# Patient Record
Sex: FEMALE | Race: WHITE | NOT HISPANIC OR LATINO | Employment: UNEMPLOYED | URBAN - METROPOLITAN AREA
[De-identification: names, ages, dates, MRNs, and addresses within clinical notes are randomized per-mention and may not be internally consistent; named-entity substitution may affect disease eponyms.]

---

## 2017-07-02 PROBLEM — F17.210 DEPENDENCE ON NICOTINE FROM CIGARETTES: Status: ACTIVE | Noted: 2017-07-02

## 2017-07-02 PROBLEM — I70.209 ARTERIAL OCCLUSION, LOWER EXTREMITY: Status: ACTIVE | Noted: 2017-07-02

## 2017-07-03 PROBLEM — E66.01 MORBID OBESITY: Status: ACTIVE | Noted: 2017-07-03

## 2017-07-03 PROBLEM — E63.9 INADEQUATE DIETARY ENERGY INTAKE: Status: ACTIVE | Noted: 2017-07-03

## 2017-10-02 ENCOUNTER — HISTORICAL (OUTPATIENT)
Dept: SURGERY | Facility: HOSPITAL | Age: 42
End: 2017-10-02

## 2020-06-29 ENCOUNTER — HISTORICAL (OUTPATIENT)
Dept: ADMINISTRATIVE | Facility: HOSPITAL | Age: 45
End: 2020-06-29

## 2020-06-29 LAB
ALBUMIN SERPL BCP-MCNC: 3.4 G/DL (ref 3.5–5)
ALBUMIN/GLOB SERPL ELPH: 1 {RATIO} (ref 1.5–2.2)
ALP SERPL-CCNC: 99 U/L (ref 45–117)
ALT SERPL W P-5'-P-CCNC: 64 U/L (ref 13–56)
ANION GAP SERPL CALC-SCNC: 10.4 MEQ/L (ref 10–20)
APTT PPP: 23.8 SEC (ref 23–30.4)
AST SERPL-CCNC: 37 U/L (ref 15–37)
BASOPHILS NFR BLD: 0 10 (ref 0–0.1)
BASOPHILS NFR BLD: 0.4 % (ref 0–1.5)
BILIRUB SERPL-MCNC: 0.38 MG/DL (ref 0.2–1)
BUN SERPL-MCNC: 15 MG/DL (ref 7–18)
CALCIUM SERPL-MCNC: 8.7 MG/DL (ref 8.5–10.1)
CHLORIDE SERPL-SCNC: 106 MMOL/L (ref 98–107)
CO2 SERPL-SCNC: 28 MMOL/L (ref 22–32)
CREAT SERPL-MCNC: 1.14 MG/DL (ref 0.55–1.02)
EGFR: 54 ML/MIN/1.73M
EOSINOPHIL NFR BLD: 0.1 10 (ref 0–0.7)
EOSINOPHIL NFR BLD: 0.7 % (ref 0–7)
ERYTHROCYTE [DISTWIDTH] IN BLOOD BY AUTOMATED COUNT: 11.8 % (ref 11.5–14.5)
GLOBULIN: 3.3 G/DL (ref 2.3–3.5)
GLUCOSE SERPL-MCNC: 113 MG/DL (ref 70–99)
GRAN #: 7.42 10 (ref 2–7.5)
GRAN%: 0.4 %
GRAN%: 75.7 % (ref 50–80)
HCT VFR BLD AUTO: 40 % (ref 37.7–47.9)
HGB BLD-MCNC: 13.6 G/DL (ref 12.2–16.2)
IMMATURE GRANULOCYTES #: 0.04 10
INR PPP: 1 (ref 0.9–1.2)
LYMPH #: 1.8 10 (ref 1–3.5)
LYMPH%: 17.8 % (ref 12–50)
MCH RBC QN AUTO: 33.5 PG (ref 27–31)
MCHC RBC AUTO-ENTMCNC: 34 G% (ref 32–35)
MCV RBC AUTO: 98.5 FL (ref 80–97)
MONO #: 0.5 10 (ref 0–0.8)
MONO%: 5 % (ref 0–12)
OSMOC: 283 MOSM/KG (ref 275–295)
PMV BLD AUTO: 10.9 FL (ref 7.4–10.4)
PMV BLD AUTO: 201 10 (ref 142–424)
POTASSIUM SERPL-SCNC: 3.4 MMOL/L (ref 3.5–5.1)
PROT SERPL-MCNC: 6.7 G/DL (ref 6.4–8.2)
PROTHROMBIN TIME: 10.1 SEC (ref 9.8–12.4)
RBC # BLD AUTO: 4.06 M/UL (ref 4.04–5.48)
SODIUM BLD-SCNC: 141 MMOL/L (ref 136–145)
TROPONIN I SERPL DL<=0.01 NG/ML-MCNC: 0.1 NG/ML (ref 0–0.05)
TROPONIN I SERPL DL<=0.01 NG/ML-MCNC: 0.16 NG/ML (ref 0–0.05)
WBC # BLD AUTO: 9.8 10 (ref 4–10.2)

## 2020-11-03 ENCOUNTER — HOSPITAL ENCOUNTER (EMERGENCY)
Facility: HOSPITAL | Age: 45
Discharge: HOME | End: 2020-11-04
Attending: EMERGENCY MEDICINE
Payer: MEDICAID

## 2020-11-03 DIAGNOSIS — S60.419A: ICD-10-CM

## 2020-11-03 DIAGNOSIS — S60.00XA CONTUSION OF FINGER OF RIGHT HAND, UNSPECIFIED FINGER, INITIAL ENCOUNTER: Primary | ICD-10-CM

## 2020-11-03 PROCEDURE — 99283 EMERGENCY DEPT VISIT LOW MDM: CPT | Mod: 25

## 2020-11-03 RX ORDER — IBUPROFEN 600 MG/1
600 TABLET ORAL
Status: COMPLETED | OUTPATIENT
Start: 2020-11-04 | End: 2020-11-04

## 2020-11-04 VITALS
DIASTOLIC BLOOD PRESSURE: 56 MMHG | SYSTOLIC BLOOD PRESSURE: 168 MMHG | HEIGHT: 64 IN | BODY MASS INDEX: 40.97 KG/M2 | OXYGEN SATURATION: 99 % | WEIGHT: 240 LBS | RESPIRATION RATE: 18 BRPM | HEART RATE: 85 BPM | TEMPERATURE: 98 F

## 2020-11-04 PROCEDURE — 25000003 PHARM REV CODE 250: Performed by: EMERGENCY MEDICINE

## 2020-11-04 RX ADMIN — IBUPROFEN 600 MG: 600 TABLET, FILM COATED ORAL at 12:11

## 2020-11-04 NOTE — ED PROVIDER NOTES
Encounter Date: 11/3/2020       History     Chief Complaint   Patient presents with    Finger Injury     Holding window open for window unit. Window fell on right hand. Pt c/o pain to righ 3rd,4th, and 5th digits. Small lac noted to middle finger      45-year-old female presents with right hand pain.  Patient states that she is holding a window open and closed on her hand.  This occurred just prior to arrival.  She complains of pain around the PIP joints of the 2nd 3rd and 4th digits.  Patient complains of pain and a small abrasion to the dorsal aspect of the middle finger        Review of patient's allergies indicates:   Allergen Reactions    Tegretol [carbamazepine] Swelling    Lamictal [lamotrigine] Rash     Past Medical History:   Diagnosis Date    Anxiety     Depression     DVT (deep venous thrombosis)     Headache(784.0)     Hepatitis     Hep C ab    History of blood clots     Hypertension     Insomnia     Pulmonary embolism     Seizures      Past Surgical History:   Procedure Laterality Date    BREAST MASS EXCISION       SECTION      CHOLECYSTECTOMY      FEMORAL ARTERY STENT      HYSTERECTOMY      LYMPHADENECTOMY      UPPER GASTROINTESTINAL ENDOSCOPY       Family History   Problem Relation Age of Onset    Stroke Mother     Heart disease Mother     Heart attack Mother     Cancer Father     Lung cancer Father     Brain cancer Father      Social History     Tobacco Use    Smoking status: Current Every Day Smoker     Packs/day: 0.50     Types: Cigarettes   Substance Use Topics    Alcohol use: No     Alcohol/week: 0.0 standard drinks    Drug use: No     Review of Systems   Musculoskeletal:        Pain to right hand and small abrasion   All other systems reviewed and are negative.      Physical Exam     Initial Vitals [20 2348]   BP Pulse Resp Temp SpO2   (!) 191/88 91 18 97.6 °F (36.4 °C) 99 %      MAP       --         Physical Exam    Nursing note and vitals  reviewed.  Constitutional: She appears well-developed and well-nourished.   HENT:   Head: Atraumatic.   Cardiovascular: Normal rate and regular rhythm.   Pulmonary/Chest: Breath sounds normal. No respiratory distress.   Musculoskeletal:      Comments: Right hand: No deformity mild tenderness to palpation diffusely of the proximal and middle phalanx of the 2nd 3rd 4th digits.  There is full range of motion of all digits.  Cap refill is brisk and sensation is intact there is a superficial abrasion to the dorsal aspect of the 3rd digit middle  phalanx         ED Course   Procedures  Labs Reviewed - No data to display       Imaging Results          X-Ray Hand 3 view Right (In process)  Result time 11/04/20 00:03:47                 Medical Decision Making:   ED Management:   X-rays negative                             Clinical Impression:     ICD-10-CM ICD-9-CM   1. Contusion of finger of right hand, unspecified finger, initial encounter  S60.00XA 923.3   2. Abrasion, finger without infection  S60.419A 915.0                          ED Disposition Condition    Discharge Stable        ED Prescriptions     None        Follow-up Information     Follow up With Specialties Details Why Contact Info    primary care physician   As needed, If symptoms worsen                                        Stephan Elizabeth MD  11/04/20 0039

## 2021-02-10 ENCOUNTER — HOSPITAL ENCOUNTER (EMERGENCY)
Facility: HOSPITAL | Age: 46
Discharge: HOME | End: 2021-02-10
Attending: EMERGENCY MEDICINE
Payer: MEDICAID

## 2021-02-10 VITALS
HEART RATE: 82 BPM | RESPIRATION RATE: 18 BRPM | BODY MASS INDEX: 39.49 KG/M2 | OXYGEN SATURATION: 99 % | DIASTOLIC BLOOD PRESSURE: 99 MMHG | WEIGHT: 237 LBS | HEIGHT: 65 IN | TEMPERATURE: 98 F | SYSTOLIC BLOOD PRESSURE: 188 MMHG

## 2021-02-10 DIAGNOSIS — S80.811A ABRASION OF RIGHT LEG, INITIAL ENCOUNTER: ICD-10-CM

## 2021-02-10 DIAGNOSIS — T14.90XA INJURY: ICD-10-CM

## 2021-02-10 DIAGNOSIS — I10 HYPERTENSION, UNSPECIFIED TYPE: ICD-10-CM

## 2021-02-10 DIAGNOSIS — M79.604 RIGHT LEG PAIN: Primary | ICD-10-CM

## 2021-02-10 PROCEDURE — 25000003 PHARM REV CODE 250: Performed by: NURSE PRACTITIONER

## 2021-02-10 PROCEDURE — 99283 EMERGENCY DEPT VISIT LOW MDM: CPT | Mod: 25

## 2021-02-10 RX ORDER — MUPIROCIN 20 MG/G
OINTMENT TOPICAL 3 TIMES DAILY
Qty: 22 G | Refills: 0 | Status: SHIPPED | OUTPATIENT
Start: 2021-02-10 | End: 2021-02-20

## 2021-02-10 RX ORDER — METOPROLOL TARTRATE 25 MG/1
10 TABLET, FILM COATED ORAL 2 TIMES DAILY
Status: ON HOLD | COMMUNITY
End: 2021-05-03 | Stop reason: HOSPADM

## 2021-02-10 RX ORDER — NIFEDIPINE 20 MG/1
20 CAPSULE ORAL EVERY 8 HOURS
Status: ON HOLD | COMMUNITY
End: 2021-05-03 | Stop reason: HOSPADM

## 2021-02-10 RX ORDER — HYDRALAZINE HYDROCHLORIDE 25 MG/1
25 TABLET, FILM COATED ORAL
Status: COMPLETED | OUTPATIENT
Start: 2021-02-10 | End: 2021-02-10

## 2021-02-10 RX ADMIN — HYDRALAZINE HYDROCHLORIDE 25 MG: 25 TABLET, FILM COATED ORAL at 02:02

## 2021-04-27 ENCOUNTER — HOSPITAL ENCOUNTER (EMERGENCY)
Facility: HOSPITAL | Age: 46
Discharge: HOME | End: 2021-04-28
Attending: EMERGENCY MEDICINE
Payer: MEDICAID

## 2021-04-27 DIAGNOSIS — R07.9 CHEST PAIN: ICD-10-CM

## 2021-04-27 DIAGNOSIS — R79.89 ELEVATED TROPONIN: ICD-10-CM

## 2021-04-27 DIAGNOSIS — I10 MALIGNANT HYPERTENSION: Primary | ICD-10-CM

## 2021-04-27 LAB
BASOPHILS # BLD AUTO: 0.03 K/UL (ref 0–0.2)
BASOPHILS NFR BLD: 0.3 % (ref 0–1.9)
DIFFERENTIAL METHOD: ABNORMAL
EOSINOPHIL # BLD AUTO: 0.2 K/UL (ref 0–0.5)
EOSINOPHIL NFR BLD: 2.1 % (ref 0–8)
ERYTHROCYTE [DISTWIDTH] IN BLOOD BY AUTOMATED COUNT: 12.3 % (ref 11.5–14.5)
HCT VFR BLD AUTO: 44.7 % (ref 37–48.5)
HGB BLD-MCNC: 14.6 G/DL (ref 12–16)
IMM GRANULOCYTES # BLD AUTO: 0.04 K/UL (ref 0–0.04)
IMM GRANULOCYTES NFR BLD AUTO: 0.4 % (ref 0–0.5)
LYMPHOCYTES # BLD AUTO: 3 K/UL (ref 1–4.8)
LYMPHOCYTES NFR BLD: 29.1 % (ref 18–48)
MCH RBC QN AUTO: 31.9 PG (ref 27–31)
MCHC RBC AUTO-ENTMCNC: 32.7 G/DL (ref 32–36)
MCV RBC AUTO: 98 FL (ref 82–98)
MONOCYTES # BLD AUTO: 0.8 K/UL (ref 0.3–1)
MONOCYTES NFR BLD: 7.8 % (ref 4–15)
NEUTROPHILS # BLD AUTO: 6.3 K/UL (ref 1.8–7.7)
NEUTROPHILS NFR BLD: 60.3 % (ref 38–73)
NRBC BLD-RTO: 0 /100 WBC
PLATELET # BLD AUTO: 183 K/UL (ref 150–450)
PMV BLD AUTO: 11.6 FL (ref 9.2–12.9)
RBC # BLD AUTO: 4.58 M/UL (ref 4–5.4)
WBC # BLD AUTO: 10.41 K/UL (ref 3.9–12.7)

## 2021-04-27 PROCEDURE — 93005 ELECTROCARDIOGRAM TRACING: CPT

## 2021-04-27 PROCEDURE — 84484 ASSAY OF TROPONIN QUANT: CPT | Performed by: EMERGENCY MEDICINE

## 2021-04-27 PROCEDURE — 83880 ASSAY OF NATRIURETIC PEPTIDE: CPT | Performed by: EMERGENCY MEDICINE

## 2021-04-27 PROCEDURE — 83690 ASSAY OF LIPASE: CPT | Performed by: EMERGENCY MEDICINE

## 2021-04-27 PROCEDURE — 96375 TX/PRO/DX INJ NEW DRUG ADDON: CPT

## 2021-04-27 PROCEDURE — 85025 COMPLETE CBC W/AUTO DIFF WBC: CPT | Performed by: EMERGENCY MEDICINE

## 2021-04-27 PROCEDURE — 93010 ELECTROCARDIOGRAM REPORT: CPT | Mod: ,,, | Performed by: INTERNAL MEDICINE

## 2021-04-27 PROCEDURE — 96365 THER/PROPH/DIAG IV INF INIT: CPT

## 2021-04-27 PROCEDURE — 36415 COLL VENOUS BLD VENIPUNCTURE: CPT | Performed by: EMERGENCY MEDICINE

## 2021-04-27 PROCEDURE — 25000003 PHARM REV CODE 250: Performed by: EMERGENCY MEDICINE

## 2021-04-27 PROCEDURE — 63600175 PHARM REV CODE 636 W HCPCS: Performed by: EMERGENCY MEDICINE

## 2021-04-27 PROCEDURE — 99285 EMERGENCY DEPT VISIT HI MDM: CPT | Mod: 25

## 2021-04-27 PROCEDURE — 80053 COMPREHEN METABOLIC PANEL: CPT | Performed by: EMERGENCY MEDICINE

## 2021-04-27 PROCEDURE — 93010 EKG 12-LEAD: ICD-10-PCS | Mod: ,,, | Performed by: INTERNAL MEDICINE

## 2021-04-27 RX ORDER — DIAZEPAM 10 MG/2ML
5 INJECTION INTRAMUSCULAR
Status: COMPLETED | OUTPATIENT
Start: 2021-04-27 | End: 2021-04-27

## 2021-04-27 RX ORDER — CLONIDINE HYDROCHLORIDE 0.2 MG/1
0.2 TABLET ORAL
Status: COMPLETED | OUTPATIENT
Start: 2021-04-27 | End: 2021-04-27

## 2021-04-27 RX ADMIN — PROMETHAZINE HYDROCHLORIDE 12.5 MG: 25 INJECTION INTRAMUSCULAR; INTRAVENOUS at 11:04

## 2021-04-27 RX ADMIN — DIAZEPAM 5 MG: 10 INJECTION, SOLUTION INTRAMUSCULAR; INTRAVENOUS at 11:04

## 2021-04-27 RX ADMIN — CLONIDINE HYDROCHLORIDE 0.2 MG: 0.2 TABLET ORAL at 11:04

## 2021-04-27 RX ADMIN — SODIUM CHLORIDE 1000 ML: 0.9 INJECTION, SOLUTION INTRAVENOUS at 11:04

## 2021-04-28 VITALS
HEIGHT: 65 IN | WEIGHT: 249 LBS | DIASTOLIC BLOOD PRESSURE: 79 MMHG | TEMPERATURE: 98 F | HEART RATE: 68 BPM | RESPIRATION RATE: 16 BRPM | BODY MASS INDEX: 41.48 KG/M2 | OXYGEN SATURATION: 98 % | SYSTOLIC BLOOD PRESSURE: 164 MMHG

## 2021-04-28 LAB
ALBUMIN SERPL BCP-MCNC: 3.2 G/DL (ref 3.5–5.2)
ALP SERPL-CCNC: 141 U/L (ref 55–135)
ALT SERPL W/O P-5'-P-CCNC: 144 U/L (ref 10–44)
ANION GAP SERPL CALC-SCNC: 4 MMOL/L (ref 8–16)
AST SERPL-CCNC: 58 U/L (ref 10–40)
BILIRUB SERPL-MCNC: 0.2 MG/DL (ref 0.1–1)
BILIRUB UR QL STRIP: NEGATIVE
BUN SERPL-MCNC: 12 MG/DL (ref 6–20)
CALCIUM SERPL-MCNC: 8.8 MG/DL (ref 8.7–10.5)
CHLORIDE SERPL-SCNC: 108 MMOL/L (ref 95–110)
CLARITY UR: CLEAR
CO2 SERPL-SCNC: 30 MMOL/L (ref 23–29)
COLOR UR: YELLOW
CREAT SERPL-MCNC: 1 MG/DL (ref 0.5–1.4)
EST. GFR  (AFRICAN AMERICAN): >60 ML/MIN/1.73 M^2
EST. GFR  (NON AFRICAN AMERICAN): >60 ML/MIN/1.73 M^2
GLUCOSE SERPL-MCNC: 125 MG/DL (ref 70–110)
GLUCOSE UR QL STRIP: NEGATIVE
HGB UR QL STRIP: NEGATIVE
KETONES UR QL STRIP: NEGATIVE
LEUKOCYTE ESTERASE UR QL STRIP: NEGATIVE
LIPASE SERPL-CCNC: 165 U/L (ref 23–300)
NITRITE UR QL STRIP: NEGATIVE
NT-PROBNP SERPL-MCNC: 1046 PG/ML (ref 5–450)
PH UR STRIP: 7 [PH] (ref 5–8)
POTASSIUM SERPL-SCNC: 3.8 MMOL/L (ref 3.5–5.1)
PROT SERPL-MCNC: 6.4 G/DL (ref 6–8.4)
PROT UR QL STRIP: NEGATIVE
SODIUM SERPL-SCNC: 142 MMOL/L (ref 136–145)
SP GR UR STRIP: 1.01 (ref 1–1.03)
TROPONIN I SERPL DL<=0.01 NG/ML-MCNC: 0.04 NG/ML (ref 0–0.03)
TROPONIN I SERPL DL<=0.01 NG/ML-MCNC: 0.05 NG/ML (ref 0–0.03)
URN SPEC COLLECT METH UR: NORMAL
UROBILINOGEN UR STRIP-ACNC: 1 EU/DL

## 2021-04-28 PROCEDURE — 84484 ASSAY OF TROPONIN QUANT: CPT | Performed by: EMERGENCY MEDICINE

## 2021-04-28 PROCEDURE — 25000003 PHARM REV CODE 250: Performed by: EMERGENCY MEDICINE

## 2021-04-28 PROCEDURE — 36415 COLL VENOUS BLD VENIPUNCTURE: CPT | Performed by: EMERGENCY MEDICINE

## 2021-04-28 PROCEDURE — 81003 URINALYSIS AUTO W/O SCOPE: CPT | Performed by: EMERGENCY MEDICINE

## 2021-04-28 RX ORDER — CLONIDINE HYDROCHLORIDE 0.2 MG/1
0.2 TABLET ORAL 2 TIMES DAILY PRN
Qty: 30 TABLET | Refills: 11 | Status: ON HOLD | OUTPATIENT
Start: 2021-04-28 | End: 2021-05-03 | Stop reason: HOSPADM

## 2021-04-28 RX ORDER — DIAZEPAM 5 MG/1
10 TABLET ORAL EVERY 8 HOURS PRN
Qty: 6 TABLET | Refills: 0 | Status: SHIPPED | OUTPATIENT
Start: 2021-04-28 | End: 2021-05-28

## 2021-04-28 RX ORDER — NAPROXEN SODIUM 220 MG/1
324 TABLET, FILM COATED ORAL
Status: COMPLETED | OUTPATIENT
Start: 2021-04-28 | End: 2021-04-28

## 2021-04-28 RX ADMIN — ASPIRIN 324 MG: 81 TABLET, CHEWABLE ORAL at 12:04

## 2021-05-02 ENCOUNTER — HOSPITAL ENCOUNTER (OUTPATIENT)
Facility: HOSPITAL | Age: 46
Discharge: HOME | End: 2021-05-03
Attending: FAMILY MEDICINE | Admitting: INTERNAL MEDICINE
Payer: MEDICAID

## 2021-05-02 DIAGNOSIS — I50.9 HEART FAILURE, UNSPECIFIED HF CHRONICITY, UNSPECIFIED HEART FAILURE TYPE: ICD-10-CM

## 2021-05-02 DIAGNOSIS — I50.9 HEART FAILURE: ICD-10-CM

## 2021-05-02 DIAGNOSIS — R79.89 ELEVATED TROPONIN: ICD-10-CM

## 2021-05-02 DIAGNOSIS — I20.89 ANGINA AT REST: ICD-10-CM

## 2021-05-02 DIAGNOSIS — R07.9 CHEST PAIN: ICD-10-CM

## 2021-05-02 DIAGNOSIS — I10 UNCONTROLLED HYPERTENSION: Primary | ICD-10-CM

## 2021-05-02 LAB
ALBUMIN SERPL BCP-MCNC: 3.3 G/DL (ref 3.5–5.2)
ALP SERPL-CCNC: 128 U/L (ref 55–135)
ALT SERPL W/O P-5'-P-CCNC: 82 U/L (ref 10–44)
ANION GAP SERPL CALC-SCNC: 7 MMOL/L (ref 8–16)
AST SERPL-CCNC: 29 U/L (ref 10–40)
BASOPHILS # BLD AUTO: 0.06 K/UL (ref 0–0.2)
BASOPHILS NFR BLD: 0.5 % (ref 0–1.9)
BILIRUB SERPL-MCNC: 0.4 MG/DL (ref 0.1–1)
BUN SERPL-MCNC: 13 MG/DL (ref 6–20)
CALCIUM SERPL-MCNC: 9.1 MG/DL (ref 8.7–10.5)
CHLORIDE SERPL-SCNC: 106 MMOL/L (ref 95–110)
CO2 SERPL-SCNC: 30 MMOL/L (ref 23–29)
CREAT SERPL-MCNC: 0.9 MG/DL (ref 0.5–1.4)
CTP QC/QA: YES
DIFFERENTIAL METHOD: ABNORMAL
EOSINOPHIL # BLD AUTO: 0.2 K/UL (ref 0–0.5)
EOSINOPHIL NFR BLD: 1.6 % (ref 0–8)
ERYTHROCYTE [DISTWIDTH] IN BLOOD BY AUTOMATED COUNT: 12.5 % (ref 11.5–14.5)
EST. GFR  (AFRICAN AMERICAN): >60 ML/MIN/1.73 M^2
EST. GFR  (NON AFRICAN AMERICAN): >60 ML/MIN/1.73 M^2
GLUCOSE SERPL-MCNC: 99 MG/DL (ref 70–110)
HCT VFR BLD AUTO: 45.3 % (ref 37–48.5)
HGB BLD-MCNC: 14.6 G/DL (ref 12–16)
IMM GRANULOCYTES # BLD AUTO: 0.06 K/UL (ref 0–0.04)
IMM GRANULOCYTES NFR BLD AUTO: 0.5 % (ref 0–0.5)
LYMPHOCYTES # BLD AUTO: 2.8 K/UL (ref 1–4.8)
LYMPHOCYTES NFR BLD: 24.4 % (ref 18–48)
MCH RBC QN AUTO: 31.3 PG (ref 27–31)
MCHC RBC AUTO-ENTMCNC: 32.2 G/DL (ref 32–36)
MCV RBC AUTO: 97 FL (ref 82–98)
MONOCYTES # BLD AUTO: 0.7 K/UL (ref 0.3–1)
MONOCYTES NFR BLD: 6.1 % (ref 4–15)
NEUTROPHILS # BLD AUTO: 7.7 K/UL (ref 1.8–7.7)
NEUTROPHILS NFR BLD: 66.9 % (ref 38–73)
NRBC BLD-RTO: 0 /100 WBC
NT-PROBNP SERPL-MCNC: 931 PG/ML (ref 5–450)
PLATELET # BLD AUTO: 206 K/UL (ref 150–450)
PMV BLD AUTO: 11.2 FL (ref 9.2–12.9)
POTASSIUM SERPL-SCNC: 3.5 MMOL/L (ref 3.5–5.1)
PROT SERPL-MCNC: 6.6 G/DL (ref 6–8.4)
RBC # BLD AUTO: 4.66 M/UL (ref 4–5.4)
SARS-COV-2 RDRP RESP QL NAA+PROBE: NEGATIVE
SODIUM SERPL-SCNC: 143 MMOL/L (ref 136–145)
TROPONIN I SERPL DL<=0.01 NG/ML-MCNC: 0.08 NG/ML (ref 0–0.03)
TROPONIN I SERPL DL<=0.01 NG/ML-MCNC: 0.08 NG/ML (ref 0–0.03)
WBC # BLD AUTO: 11.56 K/UL (ref 3.9–12.7)

## 2021-05-02 PROCEDURE — 36415 COLL VENOUS BLD VENIPUNCTURE: CPT | Performed by: NURSE PRACTITIONER

## 2021-05-02 PROCEDURE — 25000242 PHARM REV CODE 250 ALT 637 W/ HCPCS: Performed by: FAMILY MEDICINE

## 2021-05-02 PROCEDURE — 96374 THER/PROPH/DIAG INJ IV PUSH: CPT

## 2021-05-02 PROCEDURE — 84484 ASSAY OF TROPONIN QUANT: CPT | Performed by: NURSE PRACTITIONER

## 2021-05-02 PROCEDURE — 93005 ELECTROCARDIOGRAM TRACING: CPT

## 2021-05-02 PROCEDURE — 63600175 PHARM REV CODE 636 W HCPCS: Performed by: FAMILY MEDICINE

## 2021-05-02 PROCEDURE — 84484 ASSAY OF TROPONIN QUANT: CPT | Mod: 91 | Performed by: FAMILY MEDICINE

## 2021-05-02 PROCEDURE — 93010 EKG 12-LEAD: ICD-10-PCS | Mod: 59,,, | Performed by: INTERNAL MEDICINE

## 2021-05-02 PROCEDURE — 36415 COLL VENOUS BLD VENIPUNCTURE: CPT | Performed by: FAMILY MEDICINE

## 2021-05-02 PROCEDURE — G0378 HOSPITAL OBSERVATION PER HR: HCPCS

## 2021-05-02 PROCEDURE — 25000003 PHARM REV CODE 250: Performed by: FAMILY MEDICINE

## 2021-05-02 PROCEDURE — 99291 CRITICAL CARE FIRST HOUR: CPT | Mod: 25

## 2021-05-02 PROCEDURE — 85025 COMPLETE CBC W/AUTO DIFF WBC: CPT | Performed by: NURSE PRACTITIONER

## 2021-05-02 PROCEDURE — 83880 ASSAY OF NATRIURETIC PEPTIDE: CPT | Performed by: NURSE PRACTITIONER

## 2021-05-02 PROCEDURE — U0002 COVID-19 LAB TEST NON-CDC: HCPCS | Performed by: FAMILY MEDICINE

## 2021-05-02 PROCEDURE — 11000001 HC ACUTE MED/SURG PRIVATE ROOM

## 2021-05-02 PROCEDURE — 25000003 PHARM REV CODE 250: Performed by: NURSE PRACTITIONER

## 2021-05-02 PROCEDURE — 93010 ELECTROCARDIOGRAM REPORT: CPT | Mod: ,,, | Performed by: INTERNAL MEDICINE

## 2021-05-02 PROCEDURE — 80053 COMPREHEN METABOLIC PANEL: CPT | Performed by: NURSE PRACTITIONER

## 2021-05-02 PROCEDURE — 93010 ELECTROCARDIOGRAM REPORT: CPT | Mod: 59,,, | Performed by: INTERNAL MEDICINE

## 2021-05-02 PROCEDURE — 96375 TX/PRO/DX INJ NEW DRUG ADDON: CPT

## 2021-05-02 RX ORDER — FUROSEMIDE 10 MG/ML
60 INJECTION INTRAMUSCULAR; INTRAVENOUS
Status: COMPLETED | OUTPATIENT
Start: 2021-05-02 | End: 2021-05-02

## 2021-05-02 RX ORDER — CLONIDINE HYDROCHLORIDE 0.1 MG/1
0.1 TABLET ORAL
Status: COMPLETED | OUTPATIENT
Start: 2021-05-02 | End: 2021-05-02

## 2021-05-02 RX ORDER — LABETALOL HCL 20 MG/4 ML
10 SYRINGE (ML) INTRAVENOUS
Status: COMPLETED | OUTPATIENT
Start: 2021-05-02 | End: 2021-05-02

## 2021-05-02 RX ORDER — ONDANSETRON 2 MG/ML
4 INJECTION INTRAMUSCULAR; INTRAVENOUS
Status: COMPLETED | OUTPATIENT
Start: 2021-05-02 | End: 2021-05-02

## 2021-05-02 RX ORDER — NITROGLYCERIN 0.4 MG/1
0.4 TABLET SUBLINGUAL
Status: COMPLETED | OUTPATIENT
Start: 2021-05-02 | End: 2021-05-02

## 2021-05-02 RX ORDER — ASPIRIN 81 MG/1
81 TABLET ORAL
Status: COMPLETED | OUTPATIENT
Start: 2021-05-02 | End: 2021-05-02

## 2021-05-02 RX ORDER — MORPHINE SULFATE 2 MG/ML
2 INJECTION, SOLUTION INTRAMUSCULAR; INTRAVENOUS
Status: COMPLETED | OUTPATIENT
Start: 2021-05-02 | End: 2021-05-02

## 2021-05-02 RX ADMIN — CLONIDINE HYDROCHLORIDE 0.1 MG: 0.1 TABLET ORAL at 08:05

## 2021-05-02 RX ADMIN — MORPHINE SULFATE 2 MG: 2 INJECTION, SOLUTION INTRAMUSCULAR; INTRAVENOUS at 09:05

## 2021-05-02 RX ADMIN — NITROGLYCERIN 0.4 MG: 0.4 TABLET SUBLINGUAL at 11:05

## 2021-05-02 RX ADMIN — ASPIRIN 81 MG: 81 TABLET, COATED ORAL at 09:05

## 2021-05-02 RX ADMIN — ONDANSETRON HYDROCHLORIDE 4 MG: 2 SOLUTION INTRAMUSCULAR; INTRAVENOUS at 09:05

## 2021-05-02 RX ADMIN — LIDOCAINE HYDROCHLORIDE: 20 SOLUTION ORAL; TOPICAL at 11:05

## 2021-05-02 RX ADMIN — LABETALOL HYDROCHLORIDE 10 MG: 5 INJECTION, SOLUTION INTRAVENOUS at 09:05

## 2021-05-02 RX ADMIN — FUROSEMIDE 60 MG: 10 INJECTION, SOLUTION INTRAMUSCULAR; INTRAVENOUS at 09:05

## 2021-05-03 ENCOUNTER — CLINICAL SUPPORT (OUTPATIENT)
Dept: CARDIOLOGY | Facility: HOSPITAL | Age: 46
End: 2021-05-03
Payer: MEDICAID

## 2021-05-03 VITALS
SYSTOLIC BLOOD PRESSURE: 130 MMHG | RESPIRATION RATE: 20 BRPM | WEIGHT: 280 LBS | BODY MASS INDEX: 46.65 KG/M2 | OXYGEN SATURATION: 98 % | DIASTOLIC BLOOD PRESSURE: 60 MMHG | HEIGHT: 65 IN | HEART RATE: 69 BPM | TEMPERATURE: 98 F

## 2021-05-03 PROBLEM — R07.9 CHEST PAIN: Status: ACTIVE | Noted: 2021-05-03

## 2021-05-03 PROBLEM — I20.89 ANGINA AT REST: Status: ACTIVE | Noted: 2021-05-03

## 2021-05-03 PROBLEM — R79.89 ELEVATED TROPONIN: Status: ACTIVE | Noted: 2021-05-03

## 2021-05-03 LAB — TROPONIN I SERPL DL<=0.01 NG/ML-MCNC: 0.08 NG/ML (ref 0–0.03)

## 2021-05-03 PROCEDURE — 25000003 PHARM REV CODE 250: Performed by: NURSE PRACTITIONER

## 2021-05-03 PROCEDURE — 25000003 PHARM REV CODE 250: Performed by: FAMILY MEDICINE

## 2021-05-03 PROCEDURE — G0378 HOSPITAL OBSERVATION PER HR: HCPCS

## 2021-05-03 PROCEDURE — 36415 COLL VENOUS BLD VENIPUNCTURE: CPT | Performed by: FAMILY MEDICINE

## 2021-05-03 PROCEDURE — 84484 ASSAY OF TROPONIN QUANT: CPT | Performed by: FAMILY MEDICINE

## 2021-05-03 RX ORDER — LABETALOL HCL 20 MG/4 ML
10 SYRINGE (ML) INTRAVENOUS EVERY 4 HOURS PRN
Status: DISCONTINUED | OUTPATIENT
Start: 2021-05-03 | End: 2021-05-03 | Stop reason: HOSPADM

## 2021-05-03 RX ORDER — TALC
6 POWDER (GRAM) TOPICAL NIGHTLY PRN
Status: DISCONTINUED | OUTPATIENT
Start: 2021-05-03 | End: 2021-05-03 | Stop reason: HOSPADM

## 2021-05-03 RX ORDER — ENOXAPARIN SODIUM 100 MG/ML
40 INJECTION SUBCUTANEOUS EVERY 24 HOURS
Status: DISCONTINUED | OUTPATIENT
Start: 2021-05-03 | End: 2021-05-03 | Stop reason: HOSPADM

## 2021-05-03 RX ORDER — MORPHINE SULFATE 4 MG/ML
4 INJECTION, SOLUTION INTRAMUSCULAR; INTRAVENOUS EVERY 4 HOURS PRN
Status: DISCONTINUED | OUTPATIENT
Start: 2021-05-03 | End: 2021-05-03 | Stop reason: HOSPADM

## 2021-05-03 RX ORDER — ONDANSETRON 2 MG/ML
4 INJECTION INTRAMUSCULAR; INTRAVENOUS EVERY 8 HOURS PRN
Status: DISCONTINUED | OUTPATIENT
Start: 2021-05-03 | End: 2021-05-03 | Stop reason: HOSPADM

## 2021-05-03 RX ORDER — ACETAMINOPHEN 325 MG/1
650 TABLET ORAL EVERY 8 HOURS PRN
Status: DISCONTINUED | OUTPATIENT
Start: 2021-05-03 | End: 2021-05-03 | Stop reason: HOSPADM

## 2021-05-03 RX ORDER — POLYETHYLENE GLYCOL 3350 17 G/17G
17 POWDER, FOR SOLUTION ORAL DAILY
Status: DISCONTINUED | OUTPATIENT
Start: 2021-05-03 | End: 2021-05-03 | Stop reason: HOSPADM

## 2021-05-03 RX ORDER — SODIUM CHLORIDE 0.9 % (FLUSH) 0.9 %
10 SYRINGE (ML) INJECTION
Status: DISCONTINUED | OUTPATIENT
Start: 2021-05-03 | End: 2021-05-03 | Stop reason: HOSPADM

## 2021-05-03 RX ORDER — HYDROCODONE BITARTRATE AND ACETAMINOPHEN 5; 325 MG/1; MG/1
1 TABLET ORAL EVERY 4 HOURS PRN
Status: DISCONTINUED | OUTPATIENT
Start: 2021-05-03 | End: 2021-05-03 | Stop reason: HOSPADM

## 2021-05-03 RX ORDER — AMLODIPINE BESYLATE 10 MG/1
10 TABLET ORAL DAILY
Status: DISCONTINUED | OUTPATIENT
Start: 2021-05-03 | End: 2021-05-03 | Stop reason: HOSPADM

## 2021-05-03 RX ORDER — LISINOPRIL 20 MG/1
20 TABLET ORAL DAILY
Status: DISCONTINUED | OUTPATIENT
Start: 2021-05-03 | End: 2021-05-03 | Stop reason: HOSPADM

## 2021-05-03 RX ADMIN — ACETAMINOPHEN 650 MG: 325 TABLET ORAL at 08:05

## 2021-05-03 RX ADMIN — LISINOPRIL 20 MG: 20 TABLET ORAL at 08:05

## 2021-05-03 RX ADMIN — AMLODIPINE BESYLATE 10 MG: 10 TABLET ORAL at 08:05

## 2021-05-04 ENCOUNTER — PATIENT MESSAGE (OUTPATIENT)
Dept: RESEARCH | Facility: HOSPITAL | Age: 46
End: 2021-05-04

## 2021-05-17 ENCOUNTER — HOSPITAL ENCOUNTER (EMERGENCY)
Facility: HOSPITAL | Age: 46
Discharge: HOME | End: 2021-05-17
Attending: EMERGENCY MEDICINE
Payer: MEDICAID

## 2021-05-17 VITALS
OXYGEN SATURATION: 98 % | SYSTOLIC BLOOD PRESSURE: 110 MMHG | TEMPERATURE: 98 F | HEIGHT: 65 IN | WEIGHT: 239 LBS | DIASTOLIC BLOOD PRESSURE: 73 MMHG | BODY MASS INDEX: 39.82 KG/M2 | RESPIRATION RATE: 18 BRPM | HEART RATE: 56 BPM

## 2021-05-17 DIAGNOSIS — G43.009 ATYPICAL MIGRAINE: Primary | ICD-10-CM

## 2021-05-17 PROCEDURE — 96361 HYDRATE IV INFUSION ADD-ON: CPT

## 2021-05-17 PROCEDURE — 96374 THER/PROPH/DIAG INJ IV PUSH: CPT

## 2021-05-17 PROCEDURE — 96375 TX/PRO/DX INJ NEW DRUG ADDON: CPT

## 2021-05-17 PROCEDURE — 25000003 PHARM REV CODE 250: Performed by: EMERGENCY MEDICINE

## 2021-05-17 PROCEDURE — 99284 EMERGENCY DEPT VISIT MOD MDM: CPT | Mod: 25

## 2021-05-17 PROCEDURE — 63600175 PHARM REV CODE 636 W HCPCS: Performed by: EMERGENCY MEDICINE

## 2021-05-17 RX ORDER — DIPHENHYDRAMINE HYDROCHLORIDE 50 MG/ML
25 INJECTION INTRAMUSCULAR; INTRAVENOUS
Status: COMPLETED | OUTPATIENT
Start: 2021-05-17 | End: 2021-05-17

## 2021-05-17 RX ORDER — KETOROLAC TROMETHAMINE 30 MG/ML
15 INJECTION, SOLUTION INTRAMUSCULAR; INTRAVENOUS
Status: COMPLETED | OUTPATIENT
Start: 2021-05-17 | End: 2021-05-17

## 2021-05-17 RX ORDER — DIAZEPAM 10 MG/2ML
5 INJECTION INTRAMUSCULAR
Status: COMPLETED | OUTPATIENT
Start: 2021-05-17 | End: 2021-05-17

## 2021-05-17 RX ORDER — PROCHLORPERAZINE EDISYLATE 5 MG/ML
10 INJECTION INTRAMUSCULAR; INTRAVENOUS
Status: COMPLETED | OUTPATIENT
Start: 2021-05-17 | End: 2021-05-17

## 2021-05-17 RX ORDER — PROCHLORPERAZINE MALEATE 10 MG
10 TABLET ORAL EVERY 6 HOURS PRN
Qty: 30 TABLET | Refills: 0 | Status: SHIPPED | OUTPATIENT
Start: 2021-05-17 | End: 2021-05-25

## 2021-05-17 RX ADMIN — DIAZEPAM 5 MG: 10 INJECTION, SOLUTION INTRAMUSCULAR; INTRAVENOUS at 09:05

## 2021-05-17 RX ADMIN — DIPHENHYDRAMINE HYDROCHLORIDE 25 MG: 50 INJECTION INTRAMUSCULAR; INTRAVENOUS at 08:05

## 2021-05-17 RX ADMIN — SODIUM CHLORIDE 500 ML: 0.9 INJECTION, SOLUTION INTRAVENOUS at 08:05

## 2021-05-17 RX ADMIN — PROCHLORPERAZINE EDISYLATE 10 MG: 5 INJECTION INTRAMUSCULAR; INTRAVENOUS at 08:05

## 2021-05-17 RX ADMIN — KETOROLAC TROMETHAMINE 15 MG: 30 INJECTION, SOLUTION INTRAMUSCULAR; INTRAVENOUS at 09:05

## 2021-05-27 ENCOUNTER — HOSPITAL ENCOUNTER (EMERGENCY)
Facility: HOSPITAL | Age: 46
Discharge: LEFT BEFORE DISCHARGE | End: 2021-05-27
Attending: EMERGENCY MEDICINE
Payer: MEDICAID

## 2021-05-27 VITALS
BODY MASS INDEX: 38.72 KG/M2 | SYSTOLIC BLOOD PRESSURE: 128 MMHG | OXYGEN SATURATION: 97 % | TEMPERATURE: 98 F | DIASTOLIC BLOOD PRESSURE: 77 MMHG | HEIGHT: 65 IN | WEIGHT: 232.38 LBS | HEART RATE: 74 BPM | RESPIRATION RATE: 18 BRPM

## 2021-05-27 DIAGNOSIS — R13.10 DYSPHAGIA, UNSPECIFIED TYPE: Primary | ICD-10-CM

## 2021-05-27 DIAGNOSIS — R53.1 WEAKNESS: ICD-10-CM

## 2021-05-27 LAB
ALBUMIN SERPL BCP-MCNC: 4.1 G/DL (ref 3.5–5.2)
ALP SERPL-CCNC: 119 U/L (ref 55–135)
ALT SERPL W/O P-5'-P-CCNC: 56 U/L (ref 10–44)
ANION GAP SERPL CALC-SCNC: 7 MMOL/L (ref 8–16)
APTT BLDCRRT: 23.2 SEC (ref 21–32)
AST SERPL-CCNC: 35 U/L (ref 10–40)
BASOPHILS # BLD AUTO: 0.03 K/UL (ref 0–0.2)
BASOPHILS NFR BLD: 0.3 % (ref 0–1.9)
BILIRUB SERPL-MCNC: 0.4 MG/DL (ref 0.1–1)
BUN SERPL-MCNC: 14 MG/DL (ref 6–20)
CALCIUM SERPL-MCNC: 9.3 MG/DL (ref 8.7–10.5)
CHLORIDE SERPL-SCNC: 109 MMOL/L (ref 95–110)
CO2 SERPL-SCNC: 27 MMOL/L (ref 23–29)
CREAT SERPL-MCNC: 0.9 MG/DL (ref 0.5–1.4)
CTP QC/QA: YES
DIFFERENTIAL METHOD: ABNORMAL
EOSINOPHIL # BLD AUTO: 0.1 K/UL (ref 0–0.5)
EOSINOPHIL NFR BLD: 1.1 % (ref 0–8)
ERYTHROCYTE [DISTWIDTH] IN BLOOD BY AUTOMATED COUNT: 11.6 % (ref 11.5–14.5)
EST. GFR  (AFRICAN AMERICAN): >60 ML/MIN/1.73 M^2
EST. GFR  (NON AFRICAN AMERICAN): >60 ML/MIN/1.73 M^2
GLUCOSE SERPL-MCNC: 100 MG/DL (ref 70–110)
HCT VFR BLD AUTO: 43.5 % (ref 37–48.5)
HGB BLD-MCNC: 14.9 G/DL (ref 12–16)
IMM GRANULOCYTES # BLD AUTO: 0.02 K/UL (ref 0–0.04)
IMM GRANULOCYTES NFR BLD AUTO: 0.2 % (ref 0–0.5)
INR PPP: 0.9 (ref 0.8–1.2)
LYMPHOCYTES # BLD AUTO: 2.2 K/UL (ref 1–4.8)
LYMPHOCYTES NFR BLD: 22.1 % (ref 18–48)
MCH RBC QN AUTO: 31.4 PG (ref 27–31)
MCHC RBC AUTO-ENTMCNC: 34.3 G/DL (ref 32–36)
MCV RBC AUTO: 92 FL (ref 82–98)
MONOCYTES # BLD AUTO: 0.6 K/UL (ref 0.3–1)
MONOCYTES NFR BLD: 6 % (ref 4–15)
NEUTROPHILS # BLD AUTO: 6.9 K/UL (ref 1.8–7.7)
NEUTROPHILS NFR BLD: 70.3 % (ref 38–73)
NRBC BLD-RTO: 0 /100 WBC
PLATELET # BLD AUTO: 227 K/UL (ref 150–450)
PMV BLD AUTO: 11.1 FL (ref 9.2–12.9)
POTASSIUM SERPL-SCNC: 3.6 MMOL/L (ref 3.5–5.1)
PROT SERPL-MCNC: 7.5 G/DL (ref 6–8.4)
PROTHROMBIN TIME: 10.5 SEC (ref 9–12.5)
RBC # BLD AUTO: 4.75 M/UL (ref 4–5.4)
SARS-COV-2 RDRP RESP QL NAA+PROBE: NEGATIVE
SODIUM SERPL-SCNC: 143 MMOL/L (ref 136–145)
WBC # BLD AUTO: 9.77 K/UL (ref 3.9–12.7)

## 2021-05-27 PROCEDURE — 85025 COMPLETE CBC W/AUTO DIFF WBC: CPT | Performed by: EMERGENCY MEDICINE

## 2021-05-27 PROCEDURE — 25500020 PHARM REV CODE 255: Performed by: EMERGENCY MEDICINE

## 2021-05-27 PROCEDURE — U0002 COVID-19 LAB TEST NON-CDC: HCPCS | Performed by: EMERGENCY MEDICINE

## 2021-05-27 PROCEDURE — 85610 PROTHROMBIN TIME: CPT | Performed by: EMERGENCY MEDICINE

## 2021-05-27 PROCEDURE — 93010 ELECTROCARDIOGRAM REPORT: CPT | Mod: ,,, | Performed by: INTERNAL MEDICINE

## 2021-05-27 PROCEDURE — 80053 COMPREHEN METABOLIC PANEL: CPT | Performed by: EMERGENCY MEDICINE

## 2021-05-27 PROCEDURE — 36415 COLL VENOUS BLD VENIPUNCTURE: CPT | Performed by: EMERGENCY MEDICINE

## 2021-05-27 PROCEDURE — 93005 ELECTROCARDIOGRAM TRACING: CPT

## 2021-05-27 PROCEDURE — 85730 THROMBOPLASTIN TIME PARTIAL: CPT | Performed by: EMERGENCY MEDICINE

## 2021-05-27 PROCEDURE — 99285 EMERGENCY DEPT VISIT HI MDM: CPT | Mod: 25

## 2021-05-27 PROCEDURE — 93010 EKG 12-LEAD: ICD-10-PCS | Mod: ,,, | Performed by: INTERNAL MEDICINE

## 2021-05-27 RX ORDER — SODIUM CHLORIDE 9 MG/ML
INJECTION, SOLUTION INTRAVENOUS
Status: DISCONTINUED | OUTPATIENT
Start: 2021-05-27 | End: 2021-05-28 | Stop reason: HOSPADM

## 2021-05-27 RX ADMIN — IOHEXOL 100 ML: 350 INJECTION, SOLUTION INTRAVENOUS at 06:05

## 2021-06-02 ENCOUNTER — HOSPITAL ENCOUNTER (EMERGENCY)
Facility: HOSPITAL | Age: 46
Discharge: HOME | End: 2021-06-02
Attending: EMERGENCY MEDICINE
Payer: MEDICAID

## 2021-06-02 VITALS
RESPIRATION RATE: 18 BRPM | HEART RATE: 70 BPM | WEIGHT: 235.81 LBS | BODY MASS INDEX: 39.29 KG/M2 | TEMPERATURE: 98 F | OXYGEN SATURATION: 100 % | DIASTOLIC BLOOD PRESSURE: 94 MMHG | HEIGHT: 65 IN | SYSTOLIC BLOOD PRESSURE: 142 MMHG

## 2021-06-02 DIAGNOSIS — Z12.31 ENCOUNTER FOR SCREENING MAMMOGRAM FOR MALIGNANT NEOPLASM OF BREAST: Primary | ICD-10-CM

## 2021-06-02 DIAGNOSIS — R47.9 SPEECH DISTURBANCE, UNSPECIFIED TYPE: Primary | ICD-10-CM

## 2021-06-02 PROCEDURE — 63600175 PHARM REV CODE 636 W HCPCS: Performed by: EMERGENCY MEDICINE

## 2021-06-02 PROCEDURE — 96372 THER/PROPH/DIAG INJ SC/IM: CPT

## 2021-06-02 PROCEDURE — 99284 EMERGENCY DEPT VISIT MOD MDM: CPT | Mod: 25

## 2021-06-02 RX ORDER — PROCHLORPERAZINE EDISYLATE 5 MG/ML
10 INJECTION INTRAMUSCULAR; INTRAVENOUS
Status: COMPLETED | OUTPATIENT
Start: 2021-06-02 | End: 2021-06-02

## 2021-06-02 RX ADMIN — PROCHLORPERAZINE EDISYLATE 10 MG: 5 INJECTION INTRAMUSCULAR; INTRAVENOUS at 07:06

## 2021-06-10 ENCOUNTER — HOSPITAL ENCOUNTER (OUTPATIENT)
Dept: RADIOLOGY | Facility: HOSPITAL | Age: 46
Discharge: HOME | End: 2021-06-10
Attending: INTERNAL MEDICINE
Payer: MEDICAID

## 2021-06-10 VITALS — BODY MASS INDEX: 38.32 KG/M2 | WEIGHT: 230 LBS | HEIGHT: 65 IN

## 2021-06-10 DIAGNOSIS — I63.9 CEREBRAL VASCULAR ACCIDENT: Primary | ICD-10-CM

## 2021-06-10 DIAGNOSIS — Z12.31 ENCOUNTER FOR SCREENING MAMMOGRAM FOR MALIGNANT NEOPLASM OF BREAST: ICD-10-CM

## 2021-06-10 PROCEDURE — 77067 SCR MAMMO BI INCL CAD: CPT | Mod: TC

## 2021-06-16 ENCOUNTER — CLINICAL SUPPORT (OUTPATIENT)
Dept: REHABILITATION | Facility: HOSPITAL | Age: 46
End: 2021-06-16
Attending: INTERNAL MEDICINE
Payer: MEDICAID

## 2021-06-16 ENCOUNTER — DOCUMENTATION ONLY (OUTPATIENT)
Dept: REHABILITATION | Facility: HOSPITAL | Age: 46
End: 2021-06-16

## 2021-06-16 DIAGNOSIS — I69.920 APHASIA, LATE EFFECT OF CEREBROVASCULAR DISEASE: Primary | ICD-10-CM

## 2021-06-16 DIAGNOSIS — R13.10 SWALLOWING DISORDER: ICD-10-CM

## 2021-06-16 DIAGNOSIS — I69.990 APRAXIA, LATE EFFECT OF CARDIOVASCULAR DISEASE: ICD-10-CM

## 2021-06-16 DIAGNOSIS — I63.9 CEREBROVASCULAR ACCIDENT (CVA), UNSPECIFIED MECHANISM: ICD-10-CM

## 2021-06-16 DIAGNOSIS — I63.9 CEREBROVASCULAR ACCIDENT (CVA), UNSPECIFIED MECHANISM: Primary | ICD-10-CM

## 2021-06-16 DIAGNOSIS — I69.922 DYSARTHRIA AS LATE EFFECT OF CEREBROVASCULAR DISEASE: ICD-10-CM

## 2021-06-16 PROCEDURE — 97165 OT EVAL LOW COMPLEX 30 MIN: CPT

## 2021-06-16 PROCEDURE — 97161 PT EVAL LOW COMPLEX 20 MIN: CPT

## 2021-06-17 ENCOUNTER — TELEPHONE (OUTPATIENT)
Dept: REHABILITATION | Facility: HOSPITAL | Age: 46
End: 2021-06-17

## 2021-06-21 DIAGNOSIS — I63.9 CVA (CEREBRAL VASCULAR ACCIDENT): Primary | ICD-10-CM

## 2021-06-22 ENCOUNTER — CLINICAL SUPPORT (OUTPATIENT)
Dept: REHABILITATION | Facility: HOSPITAL | Age: 46
End: 2021-06-22
Attending: INTERNAL MEDICINE
Payer: MEDICAID

## 2021-06-22 ENCOUNTER — HOSPITAL ENCOUNTER (OUTPATIENT)
Dept: RADIOLOGY | Facility: HOSPITAL | Age: 46
Discharge: HOME | End: 2021-06-22
Attending: INTERNAL MEDICINE
Payer: MEDICAID

## 2021-06-22 DIAGNOSIS — I63.9 CEREBROVASCULAR ACCIDENT (CVA), UNSPECIFIED MECHANISM: Primary | ICD-10-CM

## 2021-06-22 DIAGNOSIS — I69.322 DYSARTHRIA AS LATE EFFECT OF CEREBROVASCULAR ACCIDENT (CVA): ICD-10-CM

## 2021-06-22 DIAGNOSIS — R13.12 OROPHARYNGEAL DYSPHAGIA: ICD-10-CM

## 2021-06-22 DIAGNOSIS — I63.9 CVA (CEREBRAL VASCULAR ACCIDENT): ICD-10-CM

## 2021-06-22 PROCEDURE — 25500020 PHARM REV CODE 255: Performed by: INTERNAL MEDICINE

## 2021-06-22 PROCEDURE — A9698 NON-RAD CONTRAST MATERIALNOC: HCPCS | Performed by: INTERNAL MEDICINE

## 2021-06-22 PROCEDURE — 97110 THERAPEUTIC EXERCISES: CPT | Mod: CQ

## 2021-06-22 PROCEDURE — 74230 X-RAY XM SWLNG FUNCJ C+: CPT | Mod: TC

## 2021-06-22 PROCEDURE — 97530 THERAPEUTIC ACTIVITIES: CPT

## 2021-06-22 PROCEDURE — 92611 MOTION FLUOROSCOPY/SWALLOW: CPT | Performed by: SPEECH-LANGUAGE PATHOLOGIST

## 2021-06-22 RX ADMIN — BARIUM SULFATE 176 G: 960 POWDER, FOR SUSPENSION ORAL at 09:06

## 2021-06-24 ENCOUNTER — TELEPHONE (OUTPATIENT)
Dept: REHABILITATION | Facility: HOSPITAL | Age: 46
End: 2021-06-24

## 2021-07-01 ENCOUNTER — PATIENT MESSAGE (OUTPATIENT)
Dept: ADMINISTRATIVE | Facility: OTHER | Age: 46
End: 2021-07-01

## 2021-07-13 ENCOUNTER — CLINICAL SUPPORT (OUTPATIENT)
Dept: REHABILITATION | Facility: HOSPITAL | Age: 46
End: 2021-07-13
Payer: MEDICAID

## 2021-07-13 DIAGNOSIS — I69.322 DYSARTHRIA AS LATE EFFECT OF CEREBROVASCULAR ACCIDENT (CVA): Primary | ICD-10-CM

## 2021-07-13 DIAGNOSIS — I69.320 APHASIA AS LATE EFFECT OF CEREBROVASCULAR ACCIDENT: ICD-10-CM

## 2021-07-13 DIAGNOSIS — R13.12 OROPHARYNGEAL DYSPHAGIA: ICD-10-CM

## 2021-07-13 DIAGNOSIS — I63.9 CEREBROVASCULAR ACCIDENT (CVA), UNSPECIFIED MECHANISM: Primary | ICD-10-CM

## 2021-07-13 PROCEDURE — 97530 THERAPEUTIC ACTIVITIES: CPT | Mod: CO,59

## 2021-07-13 PROCEDURE — 92507 TX SP LANG VOICE COMM INDIV: CPT | Performed by: SPEECH-LANGUAGE PATHOLOGIST

## 2021-07-13 PROCEDURE — 97110 THERAPEUTIC EXERCISES: CPT | Mod: CQ,59

## 2021-07-23 DIAGNOSIS — I63.9 CVA (CEREBRAL VASCULAR ACCIDENT): Primary | ICD-10-CM

## 2021-07-27 ENCOUNTER — HOSPITAL ENCOUNTER (OUTPATIENT)
Dept: RADIOLOGY | Facility: HOSPITAL | Age: 46
Discharge: HOME | End: 2021-07-27
Attending: PSYCHIATRY & NEUROLOGY
Payer: MEDICAID

## 2021-07-27 ENCOUNTER — CLINICAL SUPPORT (OUTPATIENT)
Dept: CARDIOLOGY | Facility: HOSPITAL | Age: 46
End: 2021-07-27
Attending: PSYCHIATRY & NEUROLOGY
Payer: MEDICAID

## 2021-07-27 VITALS — HEIGHT: 65 IN | BODY MASS INDEX: 37.82 KG/M2 | WEIGHT: 227 LBS

## 2021-07-27 DIAGNOSIS — I63.9 CVA (CEREBRAL VASCULAR ACCIDENT): ICD-10-CM

## 2021-07-27 PROCEDURE — 25500020 PHARM REV CODE 255: Performed by: PSYCHIATRY & NEUROLOGY

## 2021-07-27 PROCEDURE — 70548 MR ANGIOGRAPHY NECK W/DYE: CPT | Mod: TC

## 2021-07-27 PROCEDURE — 70551 MRI BRAIN STEM W/O DYE: CPT | Mod: TC

## 2021-07-27 PROCEDURE — A9585 GADOBUTROL INJECTION: HCPCS | Performed by: PSYCHIATRY & NEUROLOGY

## 2021-07-27 PROCEDURE — 70544 MR ANGIOGRAPHY HEAD W/O DYE: CPT | Mod: TC

## 2021-07-27 PROCEDURE — 93306 TTE W/DOPPLER COMPLETE: CPT

## 2021-07-27 RX ADMIN — GADOBUTROL 30 ML: 604.72 INJECTION INTRAVENOUS at 11:07

## 2021-07-28 LAB
AORTIC ROOT ANNULUS: 2.25 CM
AV INDEX (PROSTH): 0.72
AV MEAN GRADIENT: 3 MMHG
AV PEAK GRADIENT: 8 MMHG
AV VALVE AREA: 2.32 CM2
AV VELOCITY RATIO: 0.81
BSA FOR ECHO PROCEDURE: 2.17 M2
CV ECHO LV RWT: 0.39 CM
DOP CALC AO PEAK VEL: 1.4 M/S
DOP CALC AO VTI: 41.42 CM
DOP CALC LVOT AREA: 3.2 CM2
DOP CALC LVOT DIAMETER: 2.02 CM
DOP CALC LVOT PEAK VEL: 1.14 M/S
DOP CALC LVOT STROKE VOLUME: 96.09 CM3
DOP CALCLVOT PEAK VEL VTI: 30 CM
E WAVE DECELERATION TIME: 253.42 MSEC
E/A RATIO: 1.08
ECHO LV POSTERIOR WALL: 0.97 CM (ref 0.6–1.1)
EJECTION FRACTION: 65 %
FRACTIONAL SHORTENING: 38 % (ref 28–44)
INTERVENTRICULAR SEPTUM: 1.39 CM (ref 0.6–1.1)
LEFT ATRIUM SIZE: 3.12 CM
LEFT INTERNAL DIMENSION IN SYSTOLE: 3.08 CM (ref 2.1–4)
LEFT VENTRICLE DIASTOLIC VOLUME INDEX: 54.88 ML/M2
LEFT VENTRICLE DIASTOLIC VOLUME: 114.69 ML
LEFT VENTRICLE MASS INDEX: 107 G/M2
LEFT VENTRICLE SYSTOLIC VOLUME INDEX: 17.9 ML/M2
LEFT VENTRICLE SYSTOLIC VOLUME: 37.4 ML
LEFT VENTRICULAR INTERNAL DIMENSION IN DIASTOLE: 4.93 CM (ref 3.5–6)
LEFT VENTRICULAR MASS: 223.24 G
MV PEAK A VEL: 1.01 M/S
MV PEAK E VEL: 1.09 M/S
MV STENOSIS PRESSURE HALF TIME: 73.49 MS
MV VALVE AREA P 1/2 METHOD: 2.99 CM2
PISA TR MAX VEL: 1.35 M/S
PV PEAK VELOCITY: 0.94 CM/S
RA PRESSURE: 3 MMHG
RIGHT VENTRICULAR END-DIASTOLIC DIMENSION: 3.42 CM
TR MAX PG: 7 MMHG
TV REST PULMONARY ARTERY PRESSURE: 10 MMHG

## 2021-07-29 ENCOUNTER — CLINICAL SUPPORT (OUTPATIENT)
Dept: REHABILITATION | Facility: HOSPITAL | Age: 46
End: 2021-07-29
Payer: MEDICAID

## 2021-07-29 DIAGNOSIS — I69.322 DYSARTHRIA AS LATE EFFECT OF CEREBROVASCULAR ACCIDENT (CVA): Primary | ICD-10-CM

## 2021-07-29 DIAGNOSIS — I63.9 CEREBROVASCULAR ACCIDENT (CVA), UNSPECIFIED MECHANISM: Primary | ICD-10-CM

## 2021-07-29 PROCEDURE — 92507 TX SP LANG VOICE COMM INDIV: CPT | Performed by: SPEECH-LANGUAGE PATHOLOGIST

## 2021-07-29 PROCEDURE — 97110 THERAPEUTIC EXERCISES: CPT

## 2021-07-29 PROCEDURE — 97530 THERAPEUTIC ACTIVITIES: CPT | Mod: CO

## 2021-08-10 ENCOUNTER — DOCUMENTATION ONLY (OUTPATIENT)
Dept: REHABILITATION | Facility: HOSPITAL | Age: 46
End: 2021-08-10

## 2021-08-19 ENCOUNTER — HOSPITAL ENCOUNTER (EMERGENCY)
Facility: HOSPITAL | Age: 46
Discharge: HOME | End: 2021-08-19
Attending: EMERGENCY MEDICINE
Payer: MEDICAID

## 2021-08-19 VITALS
HEIGHT: 65 IN | WEIGHT: 227 LBS | SYSTOLIC BLOOD PRESSURE: 196 MMHG | OXYGEN SATURATION: 99 % | BODY MASS INDEX: 37.82 KG/M2 | RESPIRATION RATE: 16 BRPM | TEMPERATURE: 99 F | HEART RATE: 81 BPM | DIASTOLIC BLOOD PRESSURE: 101 MMHG

## 2021-08-19 DIAGNOSIS — I10 HYPERTENSION, UNSPECIFIED TYPE: ICD-10-CM

## 2021-08-19 DIAGNOSIS — R45.1 AGITATION: Primary | ICD-10-CM

## 2021-08-19 LAB
ALBUMIN SERPL BCP-MCNC: 3.7 G/DL (ref 3.5–5.2)
ALP SERPL-CCNC: 126 U/L (ref 55–135)
ALT SERPL W/O P-5'-P-CCNC: 69 U/L (ref 10–44)
AMPHET+METHAMPHET UR QL: ABNORMAL
ANION GAP SERPL CALC-SCNC: 10 MMOL/L (ref 8–16)
APAP SERPL-MCNC: <2 UG/ML (ref 10–20)
AST SERPL-CCNC: 34 U/L (ref 10–40)
BARBITURATES UR QL SCN>200 NG/ML: NEGATIVE
BASOPHILS # BLD AUTO: 0.03 K/UL (ref 0–0.2)
BASOPHILS NFR BLD: 0.3 % (ref 0–1.9)
BENZODIAZ UR QL SCN>200 NG/ML: NEGATIVE
BILIRUB SERPL-MCNC: 0.1 MG/DL (ref 0.1–1)
BUN SERPL-MCNC: 13 MG/DL (ref 6–20)
BZE UR QL SCN: NEGATIVE
CALCIUM SERPL-MCNC: 8.7 MG/DL (ref 8.7–10.5)
CANNABINOIDS UR QL SCN: NEGATIVE
CHLORIDE SERPL-SCNC: 104 MMOL/L (ref 95–110)
CO2 SERPL-SCNC: 29 MMOL/L (ref 23–29)
CREAT SERPL-MCNC: 1.2 MG/DL (ref 0.5–1.4)
CREAT UR-MCNC: 74.6 MG/DL (ref 15–325)
DIFFERENTIAL METHOD: ABNORMAL
EOSINOPHIL # BLD AUTO: 0.1 K/UL (ref 0–0.5)
EOSINOPHIL NFR BLD: 1.5 % (ref 0–8)
ERYTHROCYTE [DISTWIDTH] IN BLOOD BY AUTOMATED COUNT: 11.6 % (ref 11.5–14.5)
EST. GFR  (AFRICAN AMERICAN): >60 ML/MIN/1.73 M^2
EST. GFR  (NON AFRICAN AMERICAN): 53.6 ML/MIN/1.73 M^2
ETHANOL SERPL-MCNC: <3 MG/DL
GLUCOSE SERPL-MCNC: 100 MG/DL (ref 70–110)
HCT VFR BLD AUTO: 45.2 % (ref 37–48.5)
HGB BLD-MCNC: 15.1 G/DL (ref 12–16)
IMM GRANULOCYTES # BLD AUTO: 0.03 K/UL (ref 0–0.04)
IMM GRANULOCYTES NFR BLD AUTO: 0.3 % (ref 0–0.5)
LYMPHOCYTES # BLD AUTO: 1.5 K/UL (ref 1–4.8)
LYMPHOCYTES NFR BLD: 16.5 % (ref 18–48)
MCH RBC QN AUTO: 31.7 PG (ref 27–31)
MCHC RBC AUTO-ENTMCNC: 33.4 G/DL (ref 32–36)
MCV RBC AUTO: 95 FL (ref 82–98)
METHADONE UR QL SCN>300 NG/ML: NEGATIVE
MONOCYTES # BLD AUTO: 0.6 K/UL (ref 0.3–1)
MONOCYTES NFR BLD: 5.9 % (ref 4–15)
NEUTROPHILS # BLD AUTO: 7 K/UL (ref 1.8–7.7)
NEUTROPHILS NFR BLD: 75.5 % (ref 38–73)
NRBC BLD-RTO: 0 /100 WBC
OPIATES UR QL SCN: NEGATIVE
PCP UR QL SCN>25 NG/ML: NEGATIVE
PLATELET # BLD AUTO: 206 K/UL (ref 150–450)
PMV BLD AUTO: 10.7 FL (ref 9.2–12.9)
POTASSIUM SERPL-SCNC: 4.1 MMOL/L (ref 3.5–5.1)
PROT SERPL-MCNC: 7.2 G/DL (ref 6–8.4)
RBC # BLD AUTO: 4.76 M/UL (ref 4–5.4)
SALICYLATES SERPL-MCNC: 3.4 MG/DL (ref 15–30)
SODIUM SERPL-SCNC: 143 MMOL/L (ref 136–145)
TOXICOLOGY INFORMATION: ABNORMAL
WBC # BLD AUTO: 9.31 K/UL (ref 3.9–12.7)

## 2021-08-19 PROCEDURE — 80307 DRUG TEST PRSMV CHEM ANLYZR: CPT | Performed by: EMERGENCY MEDICINE

## 2021-08-19 PROCEDURE — 80143 DRUG ASSAY ACETAMINOPHEN: CPT | Performed by: EMERGENCY MEDICINE

## 2021-08-19 PROCEDURE — 80179 DRUG ASSAY SALICYLATE: CPT | Performed by: EMERGENCY MEDICINE

## 2021-08-19 PROCEDURE — 99283 EMERGENCY DEPT VISIT LOW MDM: CPT

## 2021-08-19 PROCEDURE — 36415 COLL VENOUS BLD VENIPUNCTURE: CPT | Performed by: EMERGENCY MEDICINE

## 2021-08-19 PROCEDURE — 80053 COMPREHEN METABOLIC PANEL: CPT | Performed by: EMERGENCY MEDICINE

## 2021-08-19 PROCEDURE — 25000003 PHARM REV CODE 250: Performed by: EMERGENCY MEDICINE

## 2021-08-19 PROCEDURE — 82077 ASSAY SPEC XCP UR&BREATH IA: CPT | Performed by: EMERGENCY MEDICINE

## 2021-08-19 PROCEDURE — 85025 COMPLETE CBC W/AUTO DIFF WBC: CPT | Performed by: EMERGENCY MEDICINE

## 2021-08-19 RX ORDER — CLONIDINE HYDROCHLORIDE 0.2 MG/1
0.2 TABLET ORAL
Status: COMPLETED | OUTPATIENT
Start: 2021-08-19 | End: 2021-08-19

## 2021-08-19 RX ADMIN — CLONIDINE HYDROCHLORIDE 0.2 MG: 0.2 TABLET ORAL at 09:08

## 2021-09-14 ENCOUNTER — DOCUMENTATION ONLY (OUTPATIENT)
Dept: REHABILITATION | Facility: HOSPITAL | Age: 46
End: 2021-09-14

## 2021-10-30 ENCOUNTER — HOSPITAL ENCOUNTER (EMERGENCY)
Facility: HOSPITAL | Age: 46
Discharge: HOME | End: 2021-10-30
Attending: EMERGENCY MEDICINE
Payer: MEDICAID

## 2021-10-30 VITALS
TEMPERATURE: 98 F | BODY MASS INDEX: 31.65 KG/M2 | WEIGHT: 190 LBS | SYSTOLIC BLOOD PRESSURE: 190 MMHG | HEART RATE: 85 BPM | OXYGEN SATURATION: 98 % | HEIGHT: 65 IN | RESPIRATION RATE: 18 BRPM | DIASTOLIC BLOOD PRESSURE: 90 MMHG

## 2021-10-30 DIAGNOSIS — S61.411A LACERATION OF RIGHT PALM, INITIAL ENCOUNTER: Primary | ICD-10-CM

## 2021-10-30 PROCEDURE — 63600175 PHARM REV CODE 636 W HCPCS: Performed by: CLINICAL NURSE SPECIALIST

## 2021-10-30 PROCEDURE — 90471 IMMUNIZATION ADMIN: CPT | Performed by: CLINICAL NURSE SPECIALIST

## 2021-10-30 PROCEDURE — 90715 TDAP VACCINE 7 YRS/> IM: CPT | Performed by: CLINICAL NURSE SPECIALIST

## 2021-10-30 PROCEDURE — 25000003 PHARM REV CODE 250: Performed by: CLINICAL NURSE SPECIALIST

## 2021-10-30 PROCEDURE — 12001 RPR S/N/AX/GEN/TRNK 2.5CM/<: CPT

## 2021-10-30 PROCEDURE — 99284 EMERGENCY DEPT VISIT MOD MDM: CPT | Mod: 25

## 2021-10-30 RX ORDER — LIDOCAINE HYDROCHLORIDE 10 MG/ML
5 INJECTION, SOLUTION EPIDURAL; INFILTRATION; INTRACAUDAL; PERINEURAL
Status: COMPLETED | OUTPATIENT
Start: 2021-10-30 | End: 2021-10-30

## 2021-10-30 RX ORDER — HYDROCODONE BITARTRATE AND ACETAMINOPHEN 10; 325 MG/1; MG/1
1 TABLET ORAL
Status: COMPLETED | OUTPATIENT
Start: 2021-10-30 | End: 2021-10-30

## 2021-10-30 RX ADMIN — HYDROCODONE BITARTRATE AND ACETAMINOPHEN 1 TABLET: 10; 325 TABLET ORAL at 11:10

## 2021-10-30 RX ADMIN — LIDOCAINE HYDROCHLORIDE 50 MG: 10 INJECTION, SOLUTION EPIDURAL; INFILTRATION; INTRACAUDAL; PERINEURAL at 11:10

## 2021-10-30 RX ADMIN — TETANUS TOXOID, REDUCED DIPHTHERIA TOXOID AND ACELLULAR PERTUSSIS VACCINE, ADSORBED 0.5 ML: 5; 2.5; 8; 8; 2.5 SUSPENSION INTRAMUSCULAR at 11:10

## 2022-03-07 ENCOUNTER — HOSPITAL ENCOUNTER (OUTPATIENT)
Dept: RADIOLOGY | Facility: HOSPITAL | Age: 47
Discharge: HOME | End: 2022-03-07
Attending: INTERNAL MEDICINE
Payer: MEDICAID

## 2022-03-07 DIAGNOSIS — R52 PAIN: Primary | ICD-10-CM

## 2022-03-07 DIAGNOSIS — R52 PAIN: ICD-10-CM

## 2022-03-07 PROCEDURE — 71046 X-RAY EXAM CHEST 2 VIEWS: CPT | Mod: TC

## 2022-04-29 NOTE — OP NOTE
DATE OF SURGERY:    10/02/2017    SURGEON:  Silvana Gill MD    CATH LAB CASE #:      REFERRAL:  Dr. Karmen Bradford    PREOPERATIVE DIAGNOSIS/INDICATIONS:    1. Bilateral CEAP 5/Hardeman Class 4 lower extremities.  2. History of deep venous thrombosis on Brelenta.  3. Active smoker, non-diabetic.  4. Abnormal ultrasound arterial and venous.    POSTOPERATIVE DIAGNOSIS:  Significant venous compression.    PROCEDURE:    1. Ultrasound guided access.  2. Bilateral iliac and inferior vena cava venogram.  3. PTV left percutaneous transluminal venoplasty and stenting of the left common iliac vein.    PROCEDURE TIME:  42 minutes    PROCEDURE IN DETAIL:  Following the usual sterile prepping at the groin, access was achieved with ultrasound guidance. A left iliac venogram was performed via the left venous sheath.  A right iliac and inferior vena cava venogram was performed via the right venous sheath.    FINDINGS:    1. Right iliac vein inferior vena cava widely patent.  2. Left common iliac vein significant compressive stenosis greater than 70%.      SUMMARY/RESULTS:    1. Excellent left common iliac vein results.    2. No indication for right vein intervention.        ______________________________  MD TIERRA Palacios/JASE  DD:  10/03/2017  Time:  03:21PM  DT:  10/04/2017  Time:  07:51AM  Job #:  [id number]    cc: Karmen Bradford MD.  [facility]

## 2022-04-29 NOTE — H&P
"        REASON FOR ADMISSION:    1. Chronic venous hypertension, bilateral lower extremity CEAP 5.  2. History of DVT and PE, possible IVC filter.    HISTORY OF PRESENT ILLNESS:  The patient is new to our service, referred by Dr. Karmen Bradford, for vascular surgical evaluation.  This is a patient who has a bilateral lower extremity CEAP 5 venous stasis symptomatology with a history of DVT and PE x three with questionable IVC filter insertion and has been off of Coumadin, on Brilinta.  Venous ultrasounds performed in the office were negative for DVT with bilateral common femoral vein reflux with possible ileofemoral venous compression syndrome.  We will schedule for IVC and bilateral iliac venogram with possible PTA.    PAST MEDICAL HISTORY:    1. DVT/PE.  2. Hypertension.  3. Asthma.  4. Hyper coagulopathy.  5. Vitamin D deficiency.  6. Epilepsy.  7. Depression.  8. Headaches.    PAST SURGICAL HISTORY:    1. .  2. Cholecystectomy.  3. Hysterectomy.  4. Thrombectomy with possible IVC filter insertion.    SOCIAL HISTORY:  Chronic smoker.  Denies alcohol or illicit drug use.    FAMILY HISTORY:  Noncontributory.    ALLERGIES:  Tegretol    CURRENT MEDICATIONS:  See Med Rec on patient's chart.    REVIEW OF SYSTEMS:  Essentially negative except for that discussed in the history of present illness.    PHYSICAL EXAMINATION:  VITAL SIGNS:  Blood pressure 118/74.  She is 5' 5" and weighs 250 lbs.   GENERAL:  No acute distress.   HEENT:  Sclerae, clear.  Mucous membranes moist.   NECK:  No JVD, no goiters or masses noted.   RESPIRATORY:  Clear and unlabored.   CARDIAC:  S1, S2 with a regular rate and rhythm   GI:  Soft with positive bowel sounds.   SKIN:  Warm, dry and intact.   MUSCULOSKELETAL:  Full range of motion.  Normal strength and tone.   NEURO/PSYCH:  Alert and oriented x three.  Nonfocal.   EXTREMITIES:  Bilateral lower extremities with edema and hyperpigmentation.  Diminished distal peripheral " pulses.    ASSESSMENT:    1. CVH/CVI with bilateral lower extremity CEAP 5.  2. History of DVT/PE x three with questionable IVC filter insertion, off Coumadin, currently on Brilinta.  3. Hypertension.  4. Asthma.  5. Hyper coagulopathy.  6. Epilepsy.  7. History of depression.    PLAN:  The patient is going to be scheduled for IVC and bilateral iliac venograms.  The risks, benefits and alternatives for this procedure have been discussed with the patient.  She is willing to proceed.  Further plan of care will depend on venogram findings and patient's response to prescribed therapy.      DICTATED BY LU BOCANEGRA, ANP:          ______________________________  MD BELINDA Kerr/DUARTE  DD:  09/25/2017  Time:  12:48PM  DT:  09/25/2017  Time:  01:21PM  Job #:  [id number]    The H&P was reviewed, the patient was examined, and the following changes to the patients condition are noted:  ______________________________________________________________________________  ______________________________________________________________________________  ______________________________________________________________________________  [  ] No changes to the patient's condition:      ______________________________                                             ___________________  PHYSICIAN SIGNATURE                                                             DATE/TIME

## 2022-04-29 NOTE — OP NOTE
DATE OF SURGERY:    10/02/2017    SURGEON:  Silvana Gill MD    (Addendum to report of 10/2/2017)     Cath lab case #:       Inadvertently omitted from the initial dictation report procedure in detail is as follows:       A Glidewire was advanced to the left venous sheath through the left iliac veins into the IVC.  Next a self-expanding stent is placed over the wire and deployed in the left common iliac vein and then the stent catheter was removed.  Next balloon angioplasty is performed within the stent with full balloon inflation for ten seconds x two inflations for good stent apposition and then deflated and removed.  A subsequent venogram is performed via the left venous sheath demonstrating excellent results with good venous flow.  The patient tolerated the procedure well.  All wires and catheters are removed.  Venous sheaths are left in place and the patient is sent to recovery in good condition.  No immediate complications ensued.       Procedure time:  32 minutes.        ______________________________  MD TIERRA Palacios/SHABNAM  DD:  03/07/2018  Time:  08:07AM  DT:  03/07/2018  Time:  01:57PM  Job #:  [id number]

## 2022-06-17 ENCOUNTER — HOSPITAL ENCOUNTER (INPATIENT)
Facility: HOSPITAL | Age: 47
LOS: 1 days | Discharge: HOME | DRG: 066 | End: 2022-06-18
Attending: STUDENT IN AN ORGANIZED HEALTH CARE EDUCATION/TRAINING PROGRAM | Admitting: EMERGENCY MEDICINE
Payer: MEDICAID

## 2022-06-17 DIAGNOSIS — I16.0 HYPERTENSIVE URGENCY: ICD-10-CM

## 2022-06-17 DIAGNOSIS — I63.9 STROKE: Primary | ICD-10-CM

## 2022-06-17 PROBLEM — R29.810 WEAKNESS ON LEFT SIDE OF FACE: Status: ACTIVE | Noted: 2022-06-17

## 2022-06-17 LAB
ALBUMIN SERPL BCP-MCNC: 3.9 G/DL (ref 3.5–5.2)
ALP SERPL-CCNC: 121 U/L (ref 55–135)
ALT SERPL W/O P-5'-P-CCNC: 109 U/L (ref 10–44)
ANION GAP SERPL CALC-SCNC: 5 MMOL/L (ref 8–16)
AST SERPL-CCNC: 66 U/L (ref 10–40)
BASOPHILS # BLD AUTO: 0.05 K/UL (ref 0–0.2)
BASOPHILS NFR BLD: 0.6 % (ref 0–1.9)
BILIRUB SERPL-MCNC: 0.8 MG/DL (ref 0.1–1)
BUN SERPL-MCNC: 15 MG/DL (ref 6–20)
CALCIUM SERPL-MCNC: 8.9 MG/DL (ref 8.7–10.5)
CHLORIDE SERPL-SCNC: 106 MMOL/L (ref 95–110)
CHOLEST SERPL-MCNC: 184 MG/DL (ref 120–199)
CHOLEST/HDLC SERPL: 4.1 {RATIO} (ref 2–5)
CO2 SERPL-SCNC: 29 MMOL/L (ref 23–29)
CREAT SERPL-MCNC: 1 MG/DL (ref 0.5–1.4)
CTP QC/QA: YES
DIFFERENTIAL METHOD: ABNORMAL
EOSINOPHIL # BLD AUTO: 0.1 K/UL (ref 0–0.5)
EOSINOPHIL NFR BLD: 1.7 % (ref 0–8)
ERYTHROCYTE [DISTWIDTH] IN BLOOD BY AUTOMATED COUNT: 11.7 % (ref 11.5–14.5)
EST. GFR  (AFRICAN AMERICAN): >60 ML/MIN/1.73 M^2
EST. GFR  (NON AFRICAN AMERICAN): >60 ML/MIN/1.73 M^2
GLUCOSE SERPL-MCNC: 121 MG/DL (ref 70–110)
HCT VFR BLD AUTO: 47.5 % (ref 37–48.5)
HDLC SERPL-MCNC: 45 MG/DL (ref 40–75)
HDLC SERPL: 24.5 % (ref 20–50)
HGB BLD-MCNC: 16.3 G/DL (ref 12–16)
IMM GRANULOCYTES # BLD AUTO: 0.02 K/UL (ref 0–0.04)
IMM GRANULOCYTES NFR BLD AUTO: 0.2 % (ref 0–0.5)
INR PPP: 1 (ref 0.8–1.2)
LDLC SERPL CALC-MCNC: 109.2 MG/DL (ref 63–159)
LYMPHOCYTES # BLD AUTO: 1.6 K/UL (ref 1–4.8)
LYMPHOCYTES NFR BLD: 19.6 % (ref 18–48)
MCH RBC QN AUTO: 31.8 PG (ref 27–31)
MCHC RBC AUTO-ENTMCNC: 34.3 G/DL (ref 32–36)
MCV RBC AUTO: 93 FL (ref 82–98)
MONOCYTES # BLD AUTO: 0.6 K/UL (ref 0.3–1)
MONOCYTES NFR BLD: 7.8 % (ref 4–15)
NEUTROPHILS # BLD AUTO: 5.6 K/UL (ref 1.8–7.7)
NEUTROPHILS NFR BLD: 70.1 % (ref 38–73)
NONHDLC SERPL-MCNC: 139 MG/DL
NRBC BLD-RTO: 0 /100 WBC
PLATELET # BLD AUTO: 227 K/UL (ref 150–450)
PMV BLD AUTO: 11.2 FL (ref 9.2–12.9)
POCT GLUCOSE: 113 MG/DL (ref 70–110)
POCT GLUCOSE: 124 MG/DL (ref 70–110)
POTASSIUM SERPL-SCNC: 3.6 MMOL/L (ref 3.5–5.1)
PROT SERPL-MCNC: 7.5 G/DL (ref 6–8.4)
PROTHROMBIN TIME: 10.7 SEC (ref 9–12.5)
RBC # BLD AUTO: 5.12 M/UL (ref 4–5.4)
SARS-COV-2 RDRP RESP QL NAA+PROBE: NEGATIVE
SODIUM SERPL-SCNC: 140 MMOL/L (ref 136–145)
TRIGL SERPL-MCNC: 149 MG/DL (ref 30–150)
TSH SERPL DL<=0.005 MIU/L-ACNC: 1.24 UIU/ML (ref 0.4–4)
WBC # BLD AUTO: 8.06 K/UL (ref 3.9–12.7)

## 2022-06-17 PROCEDURE — 85610 PROTHROMBIN TIME: CPT | Performed by: STUDENT IN AN ORGANIZED HEALTH CARE EDUCATION/TRAINING PROGRAM

## 2022-06-17 PROCEDURE — 36415 COLL VENOUS BLD VENIPUNCTURE: CPT | Performed by: STUDENT IN AN ORGANIZED HEALTH CARE EDUCATION/TRAINING PROGRAM

## 2022-06-17 PROCEDURE — 25000003 PHARM REV CODE 250: Performed by: STUDENT IN AN ORGANIZED HEALTH CARE EDUCATION/TRAINING PROGRAM

## 2022-06-17 PROCEDURE — 93005 ELECTROCARDIOGRAM TRACING: CPT

## 2022-06-17 PROCEDURE — 63600175 PHARM REV CODE 636 W HCPCS: Performed by: INTERNAL MEDICINE

## 2022-06-17 PROCEDURE — 80061 LIPID PANEL: CPT | Performed by: STUDENT IN AN ORGANIZED HEALTH CARE EDUCATION/TRAINING PROGRAM

## 2022-06-17 PROCEDURE — 93010 EKG 12-LEAD: ICD-10-PCS | Mod: ,,, | Performed by: INTERNAL MEDICINE

## 2022-06-17 PROCEDURE — 82962 GLUCOSE BLOOD TEST: CPT

## 2022-06-17 PROCEDURE — 99291 CRITICAL CARE FIRST HOUR: CPT | Mod: 25

## 2022-06-17 PROCEDURE — 94761 N-INVAS EAR/PLS OXIMETRY MLT: CPT

## 2022-06-17 PROCEDURE — 63600175 PHARM REV CODE 636 W HCPCS: Performed by: STUDENT IN AN ORGANIZED HEALTH CARE EDUCATION/TRAINING PROGRAM

## 2022-06-17 PROCEDURE — 99900035 HC TECH TIME PER 15 MIN (STAT)

## 2022-06-17 PROCEDURE — 11000001 HC ACUTE MED/SURG PRIVATE ROOM

## 2022-06-17 PROCEDURE — 85025 COMPLETE CBC W/AUTO DIFF WBC: CPT | Performed by: STUDENT IN AN ORGANIZED HEALTH CARE EDUCATION/TRAINING PROGRAM

## 2022-06-17 PROCEDURE — U0002 COVID-19 LAB TEST NON-CDC: HCPCS | Performed by: STUDENT IN AN ORGANIZED HEALTH CARE EDUCATION/TRAINING PROGRAM

## 2022-06-17 PROCEDURE — 84443 ASSAY THYROID STIM HORMONE: CPT | Performed by: STUDENT IN AN ORGANIZED HEALTH CARE EDUCATION/TRAINING PROGRAM

## 2022-06-17 PROCEDURE — 93010 ELECTROCARDIOGRAM REPORT: CPT | Mod: ,,, | Performed by: INTERNAL MEDICINE

## 2022-06-17 PROCEDURE — 99203 PR OFFICE/OUTPT VISIT, NEW, LEVL III, 30-44 MIN: ICD-10-PCS | Mod: 95,,, | Performed by: STUDENT IN AN ORGANIZED HEALTH CARE EDUCATION/TRAINING PROGRAM

## 2022-06-17 PROCEDURE — 99203 OFFICE O/P NEW LOW 30 MIN: CPT | Mod: 95,,, | Performed by: STUDENT IN AN ORGANIZED HEALTH CARE EDUCATION/TRAINING PROGRAM

## 2022-06-17 PROCEDURE — 80053 COMPREHEN METABOLIC PANEL: CPT | Performed by: STUDENT IN AN ORGANIZED HEALTH CARE EDUCATION/TRAINING PROGRAM

## 2022-06-17 PROCEDURE — 99900031 HC PATIENT EDUCATION (STAT)

## 2022-06-17 PROCEDURE — 96374 THER/PROPH/DIAG INJ IV PUSH: CPT

## 2022-06-17 RX ORDER — HYDRALAZINE HYDROCHLORIDE 20 MG/ML
5 INJECTION INTRAMUSCULAR; INTRAVENOUS
Status: COMPLETED | OUTPATIENT
Start: 2022-06-17 | End: 2022-06-17

## 2022-06-17 RX ORDER — LABETALOL HCL 20 MG/4 ML
10 SYRINGE (ML) INTRAVENOUS
Status: COMPLETED | OUTPATIENT
Start: 2022-06-17 | End: 2022-06-17

## 2022-06-17 RX ORDER — OXYCODONE HYDROCHLORIDE 10 MG/1
10 TABLET ORAL 2 TIMES DAILY PRN
Status: ON HOLD | COMMUNITY
Start: 2022-06-14 | End: 2022-06-18 | Stop reason: HOSPADM

## 2022-06-17 RX ORDER — LABETALOL 100 MG/1
200 TABLET, FILM COATED ORAL 2 TIMES DAILY
Status: DISCONTINUED | OUTPATIENT
Start: 2022-06-17 | End: 2022-06-18 | Stop reason: HOSPADM

## 2022-06-17 RX ORDER — ACETAMINOPHEN 500 MG
1000 TABLET ORAL
Status: COMPLETED | OUTPATIENT
Start: 2022-06-17 | End: 2022-06-17

## 2022-06-17 RX ORDER — CLONIDINE HYDROCHLORIDE 0.1 MG/1
0.1 TABLET ORAL 2 TIMES DAILY
Status: DISCONTINUED | OUTPATIENT
Start: 2022-06-17 | End: 2022-06-18 | Stop reason: HOSPADM

## 2022-06-17 RX ORDER — TALC
6 POWDER (GRAM) TOPICAL NIGHTLY PRN
Status: DISCONTINUED | OUTPATIENT
Start: 2022-06-17 | End: 2022-06-18 | Stop reason: HOSPADM

## 2022-06-17 RX ORDER — ONDANSETRON 2 MG/ML
4 INJECTION INTRAMUSCULAR; INTRAVENOUS EVERY 8 HOURS PRN
Status: DISCONTINUED | OUTPATIENT
Start: 2022-06-17 | End: 2022-06-18 | Stop reason: HOSPADM

## 2022-06-17 RX ORDER — SODIUM CHLORIDE 0.9 % (FLUSH) 0.9 %
10 SYRINGE (ML) INJECTION
Status: DISCONTINUED | OUTPATIENT
Start: 2022-06-17 | End: 2022-06-18 | Stop reason: HOSPADM

## 2022-06-17 RX ORDER — CLONIDINE HYDROCHLORIDE 0.1 MG/1
0.1 TABLET ORAL 2 TIMES DAILY
COMMUNITY
Start: 2022-06-14

## 2022-06-17 RX ORDER — ASPIRIN 81 MG/1
81 TABLET ORAL DAILY
Status: DISCONTINUED | OUTPATIENT
Start: 2022-06-18 | End: 2022-06-18 | Stop reason: HOSPADM

## 2022-06-17 RX ORDER — LABETALOL 100 MG/1
200 TABLET, FILM COATED ORAL 2 TIMES DAILY
COMMUNITY

## 2022-06-17 RX ORDER — ACETAMINOPHEN 325 MG/1
650 TABLET ORAL EVERY 8 HOURS PRN
Status: DISCONTINUED | OUTPATIENT
Start: 2022-06-17 | End: 2022-06-18 | Stop reason: HOSPADM

## 2022-06-17 RX ORDER — NAPROXEN SODIUM 220 MG/1
162 TABLET, FILM COATED ORAL
Status: COMPLETED | OUTPATIENT
Start: 2022-06-17 | End: 2022-06-17

## 2022-06-17 RX ORDER — LABETALOL HCL 20 MG/4 ML
10 SYRINGE (ML) INTRAVENOUS EVERY 4 HOURS PRN
Status: DISCONTINUED | OUTPATIENT
Start: 2022-06-17 | End: 2022-06-18 | Stop reason: HOSPADM

## 2022-06-17 RX ADMIN — Medication 10 MG: at 02:06

## 2022-06-17 RX ADMIN — APIXABAN 5 MG: 5 TABLET, FILM COATED ORAL at 09:06

## 2022-06-17 RX ADMIN — ACETAMINOPHEN 1000 MG: 500 TABLET ORAL at 01:06

## 2022-06-17 RX ADMIN — ACETAMINOPHEN 650 MG: 325 TABLET ORAL at 08:06

## 2022-06-17 RX ADMIN — Medication 10 MG: at 01:06

## 2022-06-17 RX ADMIN — LABETALOL HYDROCHLORIDE 200 MG: 100 TABLET, FILM COATED ORAL at 09:06

## 2022-06-17 RX ADMIN — METHYLPREDNISOLONE SODIUM SUCCINATE 80 MG: 40 INJECTION, POWDER, FOR SOLUTION INTRAMUSCULAR; INTRAVENOUS at 06:06

## 2022-06-17 RX ADMIN — ASPIRIN 81 MG CHEWABLE TABLET 162 MG: 81 TABLET CHEWABLE at 01:06

## 2022-06-17 RX ADMIN — CLONIDINE HYDROCHLORIDE 0.1 MG: 0.1 TABLET ORAL at 09:06

## 2022-06-17 RX ADMIN — HYDRALAZINE HYDROCHLORIDE 5 MG: 20 INJECTION, SOLUTION INTRAMUSCULAR; INTRAVENOUS at 04:06

## 2022-06-17 NOTE — ED PROVIDER NOTES
Encounter Date: 2022       History     Chief Complaint   Patient presents with    Speech Problem     Pt stated that at approx noon she began experiencing slurred speech / dizziness/ headache. Hx CVA May 2021. Pt ambulatory upon arrival to ED.      47-year-old female with history of uncontrolled hypertension, multiple blood clots not on any blood thinners presents with slurring of the speech and dizziness that started around 12:00 p.m. patient has history of stroke in May 2021 with residual right arm weakness.  Denies any difficulty walking, vomiting, diarrhea        Review of patient's allergies indicates:   Allergen Reactions    Tegretol [carbamazepine] Swelling    Lamictal [lamotrigine] Rash     Past Medical History:   Diagnosis Date    Anxiety     Depression     DVT (deep venous thrombosis)     Headache(784.0)     Hepatitis     Hep C ab    History of blood clots     Hypertension     Insomnia     Pulmonary embolism     Seizures     Stroke      Past Surgical History:   Procedure Laterality Date    BREAST BIOPSY Left     benign    BREAST MASS EXCISION       SECTION      CHOLECYSTECTOMY      FEMORAL ARTERY STENT      HYSTERECTOMY      LYMPHADENECTOMY      OOPHORECTOMY      UPPER GASTROINTESTINAL ENDOSCOPY       Family History   Problem Relation Age of Onset    Stroke Mother     Heart disease Mother     Heart attack Mother     Ovarian cancer Mother     Cancer Father     Lung cancer Father     Brain cancer Father     Stroke Sister     Breast cancer Sister     No Known Problems Daughter     No Known Problems Maternal Aunt     No Known Problems Maternal Uncle     No Known Problems Paternal Aunt     No Known Problems Paternal Uncle     No Known Problems Maternal Grandmother     No Known Problems Maternal Grandfather     No Known Problems Paternal Grandmother     No Known Problems Paternal Grandfather     BRCA 1/2 Neg Hx      Social History     Tobacco Use    Smoking  status: Current Every Day Smoker     Packs/day: 0.50     Types: Cigarettes    Smokeless tobacco: Never Used   Substance Use Topics    Alcohol use: No     Alcohol/week: 0.0 standard drinks    Drug use: No     Review of Systems   Constitutional: Negative.    HENT: Negative.    Respiratory: Negative.    Cardiovascular: Negative.    Gastrointestinal: Negative.    Genitourinary: Negative.    Musculoskeletal: Negative.    Skin: Negative.    Neurological: Positive for dizziness, speech difficulty and headaches.   Psychiatric/Behavioral: Negative.    All other systems reviewed and are negative.      Physical Exam     Initial Vitals   BP Pulse Resp Temp SpO2   06/17/22 1304 06/17/22 1258 06/17/22 1258 06/17/22 1403 06/17/22 1258   (!) 218/120 86 18 98.1 °F (36.7 °C) 97 %      MAP       --                Physical Exam    Nursing note and vitals reviewed.  Constitutional: Vital signs are normal. She appears well-developed and well-nourished.   HENT:   Head: Normocephalic and atraumatic.   Eyes: Conjunctivae and lids are normal.   Neck: Trachea normal. Neck supple.   Cardiovascular: Normal rate, regular rhythm, normal heart sounds, intact distal pulses and normal pulses.   No murmur heard.  Pulmonary/Chest: Breath sounds normal. No respiratory distress.   Abdominal: Abdomen is soft. Bowel sounds are normal.   Musculoskeletal:         General: Normal range of motion.      Cervical back: Neck supple.     Neurological: She is alert and oriented to person, place, and time. She has normal strength. No cranial nerve deficit or sensory deficit.   Left-sided facial droop.    Skin: Skin is warm. Capillary refill takes less than 2 seconds.   Psychiatric: She has a normal mood and affect. Her speech is normal. Thought content normal.         ED Course   Critical Care    Date/Time: 6/17/2022 1:44 PM  Performed by: Klaus Fontana MD  Authorized by: Klaus Fontana MD   Direct patient critical care time: 10 minutes  Additional history  critical care time: 5 minutes  Ordering / reviewing critical care time: 5 minutes  Documentation critical care time: 5 minutes  Consulting other physicians critical care time: 5 minutes  Other critical care time: 5 minutes  Total critical care time (exclusive of procedural time) : 35 minutes  Critical care time was exclusive of separately billable procedures and treating other patients.  Critical care was necessary to treat or prevent imminent or life-threatening deterioration of the following conditions: CNS failure or compromise.  Critical care was time spent personally by me on the following activities: evaluation of patient's response to treatment, examination of patient, ordering and performing treatments and interventions, obtaining history from patient or surrogate, ordering and review of laboratory studies, ordering and review of radiographic studies, re-evaluation of patient's condition and pulse oximetry.        Labs Reviewed   CBC W/ AUTO DIFFERENTIAL - Abnormal; Notable for the following components:       Result Value    Hemoglobin 16.3 (*)     MCH 31.8 (*)     All other components within normal limits   COMPREHENSIVE METABOLIC PANEL - Abnormal; Notable for the following components:    Glucose 121 (*)     AST 66 (*)      (*)     Anion Gap 5 (*)     All other components within normal limits   POCT GLUCOSE - Abnormal; Notable for the following components:    POCT Glucose 124 (*)     All other components within normal limits   PROTIME-INR   TSH   LIPID PANEL   SARS-COV-2 RDRP GENE    Narrative:     This test utilizes isothermal nucleic acid amplification   technology to detect the SARS-CoV-2 RdRp nucleic acid segment.   The analytical sensitivity (limit of detection) is 125 genome   equivalents/mL.   A POSITIVE result implies infection with the SARS-CoV-2 virus;   the patient is presumed to be contagious.     A NEGATIVE result means that SARS-CoV-2 nucleic acids are not   present above the limit of  detection. A NEGATIVE result should be   treated as presumptive. It does not rule out the possibility of   COVID-19 and should not be the sole basis for treatment decisions.   If COVID-19 is strongly suspected based on clinical and exposure   history, re-testing using an alternate molecular assay should be   considered.   This test is only for use under the Food and Drug   Administration s Emergency Use Authorization (EUA).   Commercial kits are provided by [employer].   Performance characteristics of the EUA have been independently   verified by [facility] Department of   Pathology and Laboratory Medicine.   _________________________________________________________________   The authorized Fact Sheet for Healthcare Providers and the authorized Fact   Sheet for Patients of the ID NOW COVID-19 are available on the FDA   website:     https://www.fda.gov/media/725084/download  https://www.fda.gov/media/497046/download           POCT GLUCOSE, HAND-HELD DEVICE   POCT PROTIME-INR     EKG Readings: (Independently Interpreted)   Initial Reading: No STEMI. Rhythm: Normal Sinus Rhythm. Conduction: Normal. Axis: Left Axis Deviation.       Imaging Results          MRI Brain Without Contrast (Final result)  Result time 06/17/22 16:11:16    Final result by Golden Santos MD (06/17/22 16:11:16)                 Impression:      Acute right frontal infarct mild local mass effect.      Electronically signed by: Golden Santos MD  Date:    06/17/2022  Time:    16:11             Narrative:    EXAMINATION:  MRI BRAIN WITHOUT CONTRAST    CLINICAL HISTORY:  Stroke, follow up;    TECHNIQUE:  MRI of the brain was performed in multiple planes and sequences.    COMPARISON:  Head CT 06/17/2022.    FINDINGS:  Acute infarct right frontal lobe with mild local mass effect.  No other infarct identified.  No hydrocephalus.  No mass visualized.  Mild chronic microvascular white matter ischemic change.  Unremarkable pituitary.                                CT Head Without Contrast (Final result)  Result time 06/17/22 13:27:58    Final result by Jack Crouch MD (06/17/22 13:27:58)                 Impression:      An evolving infarct in the right frontal lobe.    No acute intracranial hemorrhage.      Electronically signed by: Jack Crouch MD  Date:    06/17/2022  Time:    13:27             Narrative:    EXAMINATION:  CT HEAD WITHOUT CONTRAST    CLINICAL HISTORY:  Neuro deficit, acute, stroke suspected;    TECHNIQUE:  Axial CT images were obtained. Iterative reconstruction technique was used.  CT/cardiac nuclear exam/s in prior 12 months: 0.    COMPARISON:  CT head without IV contrast 05/27/2021.    FINDINGS:  There is an evolving infarct in the right frontal lobe.  No acute intracranial hemorrhage.  No ventricular dilatation or abnormal extra-axial fluid collection.  No osseous abnormality.                                 Medications   labetalol 20 mg/4 mL (5 mg/mL) IV syring (10 mg Intravenous Given 6/17/22 1453)   labetalol 20 mg/4 mL (5 mg/mL) IV syring (10 mg Intravenous Given 6/17/22 1347)   aspirin chewable tablet 162 mg (162 mg Oral Given 6/17/22 1351)   acetaminophen tablet 1,000 mg (1,000 mg Oral Given 6/17/22 1351)   hydrALAZINE injection 5 mg (5 mg Intravenous Given 6/17/22 1641)     Medical Decision Making:   Initial Assessment:   47-year-old female with history of uncontrolled hypertension, multiple blood clots not on any blood thinners presents with slurring of the speech and dizziness that started around 12:00 p.m. patient hypertensive but otherwise stable vitals.  Glucose within normal limits.  Stroke alert performed.  Spoke to neurologist to says that patient is not tPA candidate due to lack of deficit.  Will control blood pressure.  Plan to admit patient for stroke workup and risk stratification  Clinical Tests:   Lab Tests: Ordered and Reviewed  The following lab test(s) were unremarkable: CBC, CMP and  Troponin  Radiological Study: Reviewed and Ordered  Medical Tests: Ordered and Reviewed             ED Course as of 06/17/22 1647   Fri Jun 17, 2022   1433 Neurologically improved.  Will start patient on blood thinners as patient has extensive history of clotting disorder.  Admit patient for stroke evaluation [HD]   1433  stroke evaluation [HD]      ED Course User Index  [HD] Klaus Fontana MD             Clinical Impression:   Final diagnoses:  [I63.9] Stroke (Primary)  [I16.0] Hypertensive urgency          ED Disposition Condition    Admit               Klaus Fontana MD  06/17/22 0176

## 2022-06-17 NOTE — NURSING
Patient up from ER with a BP in 200 systolic's and 100 diastolic's before arriving.   Patient's BP on arrival was 188/86.     Patient with complaints of a headache, Dr. Wright notified and put in new orders.     Patient has a L forearm 20g IV that is saline locked.   neurochecks Q4hrs  Patient ambulates without difficulty.

## 2022-06-17 NOTE — CONSULTS
[facility]  Vascular Neurology  Comprehensive Stroke Center  TeleVascular Neurology Acute Consultation Note      Consult to Telemedicine - Acute Stroke  Consult performed by: Del Mistry MD  Consult ordered by: Klaus Fontana MD          Consulting Provider: KLAUS FONTANA.  Current Providers  No providers found    Patient Location:  [facility] EMERGENCY DEPARTMENT Emergency Department  Spoke hospital nurse at bedside with patient assisting consultant.     Patient information was obtained from patient.         Assessment/Plan:       Diagnoses:   Weakness on left side of face  - rec MRI brain to r/o stroke   - No tPA since symptoms are non disabling         STROKE DOCUMENTATION     Acute Stroke Times:   Acute Stroke Times   Last Known Normal Date: 06/17/22  Last Known Normal Time: 1200  Symptom Onset Date: 06/17/22  Symptom Onset Time: 1200  Stroke Team Called Date: 06/17/22  Stroke Team Called Time: 1313  Stroke Team Arrival Date: 06/17/22  Stroke Team Arrival Time: 1318  CT Interpretation Time: 1320  Alteplase Recommended: No  Thrombectomy Recommended: No    NIH Scale:  Interval: baseline  1a. Level of Consciousness: 0-->Alert, keenly responsive  1b. LOC Questions: 0-->Answers both questions correctly  1c. LOC Commands: 0-->Performs both tasks correctly  2. Best Gaze: 0-->Normal  3. Visual: 0-->No visual loss  4. Facial Palsy: 1-->Minor paralysis (flattened nasolabial fold, asymmetry on smiling)  5a. Motor Arm, Left: 0-->No drift, limb holds 90 (or 45) degrees for full 10 secs  5b. Motor Arm, Right: 0-->No drift, limb holds 90 (or 45) degrees for full 10 secs  6a. Motor Leg, Left: 0-->No drift, leg holds 30 degree position for full 5 secs  6b. Motor Leg, Right: 0-->No drift, leg holds 30 degree position for full 5 secs  7. Limb Ataxia: 0-->Absent  8. Sensory: 0-->Normal, no sensory loss  9. Best Language: 0-->No aphasia, normal  10. Dysarthria: 1-->Mild-to-moderate  "dysarthria, patient slurs at least some words and, at worst, can be understood with some difficulty  11. Extinction and Inattention (formerly Neglect): 0-->No abnormality  Total (NIH Stroke Scale): 2     Modified Jack    Manteno Coma Scale:    ABCD2 Score:    [id number] Score:   HAS -BLED Score:   ICH Score:   Hunt & Rm Classification:       Blood pressure (!) 218/120, pulse 86, resp. rate 18, height 5' 5" (1.651 m), weight 105.2 kg (232 lb), SpO2 97 %, not currently breastfeeding.  Alteplase Eligible?: No  Alteplase Recommendation: Alteplase not recommended due to Mild Non-Disabling Symptoms  Possible Interventional Revascularization Candidate? No; at this time symptoms not suggestive of large vessel occlusion    Disposition Recommendation: do not transfer    Subjective:     History of Present Illness:  Pt presented to the ED for slurring of speech and difficulty ambulating well. She reports the symptoms started at noon.         Woke up with symptoms?: no    Recent bleeding noted: no  Does the patient take any Blood Thinners? unknown  Medications: Unknown      Past Medical History: stroke    Past Surgical History: no relevant surgical history    Family History: diabetes    Social History: unable to obtain    Allergies: Tegretol [Carbamazepine]  Lamictal [Lamotrigine] No relevant allergies    Review of Systems  Objective:   Vitals: Blood pressure (!) 218/120, pulse 86, resp. rate 18, height 5' 5" (1.651 m), weight 105.2 kg (232 lb), SpO2 97 %, not currently breastfeeding. BP: chart reviewed     CT READ: Yes  No hemmorhage. No mass effect. No early infarct signs.     Physical Exam    Left face droop   No weakness   No numbness   Mild dysarthria   No ataxia         Recommended the emergency room physician to have a brief discussion with the patient and/or family if available regarding the  risks and benefits of treatment, and to briefly document the occurrence of that discussion in his clinical encounter note. "     The attending portion of this evaluation, treatment, and documentation was performed per Del Mistry MD via audiovisual.    Billing code:  (non-intervention mild to moderate stroke, TIA, some mimics)    · This patient has a critical neurological condition/illness, with some potential for high morbidity and mortality.  · There is a moderate probability for acute neurological change leading to clinical and possibly life-threatening deterioration requiring highest level of physician preparedness for urgent intervention.  · Care was coordinated with other physicians involved in the patient's care.  · Radiologic studies and laboratory data were reviewed and interpreted, and plan of care was re-assessed based on the results.  · Diagnosis, treatment options and prognosis may have been discussed with the patient and/or family members or caregiver.      In your opinion, this was a: Tier 1 Van Negative    Consult End Time: 1:32 PM     Del Mistry MD  Comprehensive Stroke Center  Vascular Neurology   [facility]

## 2022-06-17 NOTE — ED NOTES
MD states that he does not want to drop patient's BP too fast. He states okay to send patient to [facility] with /113. Spoke to Cecilia on 6th floor and notified her of this as well. Patient states her BP normally greater than 200 systolic.

## 2022-06-17 NOTE — SUBJECTIVE & OBJECTIVE
"  Woke up with symptoms?: no    Recent bleeding noted: no  Does the patient take any Blood Thinners? unknown  Medications: Unknown      Past Medical History: stroke    Past Surgical History: no relevant surgical history    Family History: diabetes    Social History: unable to obtain    Allergies: Tegretol [Carbamazepine]  Lamictal [Lamotrigine] No relevant allergies    Review of Systems  Objective:   Vitals: Blood pressure (!) 218/120, pulse 86, resp. rate 18, height 5' 5" (1.651 m), weight 105.2 kg (232 lb), SpO2 97 %, not currently breastfeeding. BP: chart reviewed     CT READ: Yes  No hemmorhage. No mass effect. No early infarct signs.     Physical Exam    Left face droop   No weakness   No numbness   Mild dysarthria   No ataxia     "

## 2022-06-17 NOTE — HPI
Pt presented to the ED for slurring of speech and difficulty ambulating well. She reports the symptoms started at noon.

## 2022-06-18 VITALS
TEMPERATURE: 99 F | DIASTOLIC BLOOD PRESSURE: 58 MMHG | BODY MASS INDEX: 38.65 KG/M2 | HEART RATE: 62 BPM | WEIGHT: 232 LBS | OXYGEN SATURATION: 96 % | SYSTOLIC BLOOD PRESSURE: 121 MMHG | HEIGHT: 65 IN | RESPIRATION RATE: 18 BRPM

## 2022-06-18 LAB
ALBUMIN SERPL BCP-MCNC: 3.7 G/DL (ref 3.5–5.2)
ALP SERPL-CCNC: 113 U/L (ref 55–135)
ALT SERPL W/O P-5'-P-CCNC: 108 U/L (ref 10–44)
ANION GAP SERPL CALC-SCNC: 5 MMOL/L (ref 8–16)
AST SERPL-CCNC: 46 U/L (ref 10–40)
BASOPHILS # BLD AUTO: 0.05 K/UL (ref 0–0.2)
BASOPHILS NFR BLD: 0.6 % (ref 0–1.9)
BILIRUB SERPL-MCNC: 0.7 MG/DL (ref 0.1–1)
BUN SERPL-MCNC: 14 MG/DL (ref 6–20)
CALCIUM SERPL-MCNC: 9 MG/DL (ref 8.7–10.5)
CHLORIDE SERPL-SCNC: 105 MMOL/L (ref 95–110)
CO2 SERPL-SCNC: 30 MMOL/L (ref 23–29)
CREAT SERPL-MCNC: 1.1 MG/DL (ref 0.5–1.4)
DIFFERENTIAL METHOD: ABNORMAL
EOSINOPHIL # BLD AUTO: 0.2 K/UL (ref 0–0.5)
EOSINOPHIL NFR BLD: 2.4 % (ref 0–8)
ERYTHROCYTE [DISTWIDTH] IN BLOOD BY AUTOMATED COUNT: 11.6 % (ref 11.5–14.5)
EST. GFR  (AFRICAN AMERICAN): >60 ML/MIN/1.73 M^2
EST. GFR  (NON AFRICAN AMERICAN): 59.1 ML/MIN/1.73 M^2
GLUCOSE SERPL-MCNC: 113 MG/DL (ref 70–110)
HCT VFR BLD AUTO: 47.9 % (ref 37–48.5)
HGB BLD-MCNC: 16.2 G/DL (ref 12–16)
IMM GRANULOCYTES # BLD AUTO: 0.03 K/UL (ref 0–0.04)
IMM GRANULOCYTES NFR BLD AUTO: 0.4 % (ref 0–0.5)
LYMPHOCYTES # BLD AUTO: 2.7 K/UL (ref 1–4.8)
LYMPHOCYTES NFR BLD: 34.3 % (ref 18–48)
MCH RBC QN AUTO: 31.6 PG (ref 27–31)
MCHC RBC AUTO-ENTMCNC: 33.8 G/DL (ref 32–36)
MCV RBC AUTO: 94 FL (ref 82–98)
MONOCYTES # BLD AUTO: 0.7 K/UL (ref 0.3–1)
MONOCYTES NFR BLD: 8.4 % (ref 4–15)
NEUTROPHILS # BLD AUTO: 4.3 K/UL (ref 1.8–7.7)
NEUTROPHILS NFR BLD: 53.9 % (ref 38–73)
NRBC BLD-RTO: 0 /100 WBC
PLATELET # BLD AUTO: 223 K/UL (ref 150–450)
PMV BLD AUTO: 11.1 FL (ref 9.2–12.9)
POCT GLUCOSE: 99 MG/DL (ref 70–110)
POTASSIUM SERPL-SCNC: 4 MMOL/L (ref 3.5–5.1)
PROT SERPL-MCNC: 7.1 G/DL (ref 6–8.4)
RBC # BLD AUTO: 5.12 M/UL (ref 4–5.4)
SODIUM SERPL-SCNC: 140 MMOL/L (ref 136–145)
WBC # BLD AUTO: 7.88 K/UL (ref 3.9–12.7)

## 2022-06-18 PROCEDURE — 99900035 HC TECH TIME PER 15 MIN (STAT)

## 2022-06-18 PROCEDURE — 94761 N-INVAS EAR/PLS OXIMETRY MLT: CPT

## 2022-06-18 PROCEDURE — 99900031 HC PATIENT EDUCATION (STAT)

## 2022-06-18 PROCEDURE — 80053 COMPREHEN METABOLIC PANEL: CPT | Performed by: STUDENT IN AN ORGANIZED HEALTH CARE EDUCATION/TRAINING PROGRAM

## 2022-06-18 PROCEDURE — 36415 COLL VENOUS BLD VENIPUNCTURE: CPT | Performed by: STUDENT IN AN ORGANIZED HEALTH CARE EDUCATION/TRAINING PROGRAM

## 2022-06-18 PROCEDURE — 85025 COMPLETE CBC W/AUTO DIFF WBC: CPT | Performed by: STUDENT IN AN ORGANIZED HEALTH CARE EDUCATION/TRAINING PROGRAM

## 2022-06-18 PROCEDURE — 25000003 PHARM REV CODE 250: Performed by: STUDENT IN AN ORGANIZED HEALTH CARE EDUCATION/TRAINING PROGRAM

## 2022-06-18 RX ORDER — ROSUVASTATIN CALCIUM 20 MG/1
20 TABLET, COATED ORAL DAILY
Qty: 30 TABLET | Refills: 0 | Status: SHIPPED | OUTPATIENT
Start: 2022-06-18

## 2022-06-18 RX ADMIN — APIXABAN 5 MG: 5 TABLET, FILM COATED ORAL at 09:06

## 2022-06-18 RX ADMIN — ASPIRIN 81 MG: 81 TABLET, COATED ORAL at 09:06

## 2022-06-18 RX ADMIN — CLONIDINE HYDROCHLORIDE 0.1 MG: 0.1 TABLET ORAL at 09:06

## 2022-06-18 RX ADMIN — LABETALOL HYDROCHLORIDE 200 MG: 100 TABLET, FILM COATED ORAL at 09:06

## 2022-06-18 RX ADMIN — ACETAMINOPHEN 650 MG: 325 TABLET ORAL at 10:06

## 2022-06-18 NOTE — HOSPITAL COURSE
6/18 SD: Pt reports headache, dizziness, slurred speech, and change in sensation to face are all resolved. Pt currently has no neurologic deficits. Plan to discharge home on anticoagulation and statin therapy with close outpatient follow-up.

## 2022-06-18 NOTE — ASSESSMENT & PLAN NOTE
MRI indicates acute right frontal infarct mild local mass effect.  Pt currently has no neurologic deficits. Plan to discharge home with anticoagulation and statin therapies and close outpatient follow-up.

## 2022-06-18 NOTE — ASSESSMENT & PLAN NOTE
MRI indicates acute right frontal infarct mild local mass effect. Start patient on anticoagulation and statin therapies.

## 2022-06-18 NOTE — SUBJECTIVE & OBJECTIVE
Past Medical History:   Diagnosis Date    Anxiety     Depression     DVT (deep venous thrombosis)     Headache(784.0)     Hepatitis     Hep C ab    History of blood clots     Hypertension     Insomnia     Pulmonary embolism     Seizures     Smoker     Stroke        Past Surgical History:   Procedure Laterality Date    BREAST BIOPSY Left     benign    BREAST MASS EXCISION       SECTION      CHOLECYSTECTOMY      FEMORAL ARTERY STENT      HYSTERECTOMY      LYMPHADENECTOMY      OOPHORECTOMY      UPPER GASTROINTESTINAL ENDOSCOPY         Review of patient's allergies indicates:   Allergen Reactions    Tegretol [carbamazepine] Swelling    Lamictal [lamotrigine] Rash       No current facility-administered medications on file prior to encounter.     Current Outpatient Medications on File Prior to Encounter   Medication Sig    albuterol (PROVENTIL/VENTOLIN HFA) 90 mcg/actuation inhaler Inhale 2 puffs into the lungs every 6 (six) hours as needed for Wheezing.    cloNIDine (CATAPRES) 0.2 MG tablet Take 0.1 mg by mouth 2 (two) times daily.    labetaloL (NORMODYNE) 100 MG tablet Take 200 mg by mouth 2 (two) times daily.    aspirin (ECOTRIN) 81 MG EC tablet Take 1 tablet (81 mg total) by mouth once daily.    butalbital-acetaminophen-caffeine -40 mg (FIORICET, ESGIC) -40 mg per tablet Take 1 tablet by mouth every 4 to 6 hours as needed for Headaches.    clonazePAM (KLONOPIN) 2 MG Tab One table one to two times daily ONLY as needed for severe anxiety.    cloNIDine (CATAPRES) 0.1 MG tablet Take 0.1 mg by mouth 2 (two) times daily.    diazePAM (VALIUM) 5 MG tablet Take 2 tablets (10 mg total) by mouth every 8 (eight) hours as needed for Anxiety.    oxyCODONE (ROXICODONE) 10 mg Tab immediate release tablet Take 10 mg by mouth 2 (two) times daily as needed.     Family History       Problem Relation (Age of Onset)    Brain cancer Father    Breast cancer Sister    Cancer Father    Heart attack Mother    Heart disease  Mother    Lung cancer Father    No Known Problems Daughter, Maternal Aunt, Maternal Uncle, Paternal Aunt, Paternal Uncle, Maternal Grandmother, Maternal Grandfather, Paternal Grandmother, Paternal Grandfather    Ovarian cancer Mother    Stroke Mother, Sister          Tobacco Use    Smoking status: Current Every Day Smoker     Packs/day: 0.50     Types: Cigarettes    Smokeless tobacco: Never Used   Substance and Sexual Activity    Alcohol use: No     Alcohol/week: 0.0 standard drinks    Drug use: No    Sexual activity: Not on file     Review of Systems   Constitutional:  Negative for activity change, chills and fever.   HENT: Negative.     Respiratory:  Negative for cough, chest tightness and shortness of breath.    Cardiovascular:  Negative for chest pain, palpitations and leg swelling.   Gastrointestinal:  Negative for abdominal pain, constipation, diarrhea, nausea and vomiting.   Endocrine: Negative.    Genitourinary:  Negative for difficulty urinating and flank pain.   Musculoskeletal: Negative.    Skin: Negative.    Neurological:  Negative for dizziness, facial asymmetry, speech difficulty, weakness, light-headedness, numbness and headaches.   Psychiatric/Behavioral:  Negative for agitation, confusion and hallucinations. The patient is not nervous/anxious.    Objective:     Vital Signs (Most Recent):  Temp: 98.5 °F (36.9 °C) (06/18/22 0756)  Pulse: 62 (06/18/22 0949)  Resp: 18 (06/18/22 0826)  BP: (!) 121/58 (06/18/22 0949)  SpO2: 96 % (06/18/22 0826)   Vital Signs (24h Range):  Temp:  [97.7 °F (36.5 °C)-98.5 °F (36.9 °C)] 98.5 °F (36.9 °C)  Pulse:  [58-86] 62  Resp:  [16-20] 18  SpO2:  [96 %-100 %] 96 %  BP: (121-231)/() 121/58     Weight: 105.2 kg (232 lb)  Body mass index is 38.61 kg/m².    Physical Exam  Vitals and nursing note reviewed.   Constitutional:       Appearance: She is obese.   HENT:      Head: Normocephalic.      Mouth/Throat:      Mouth: Mucous membranes are moist.      Pharynx: Oropharynx  is clear.   Eyes:      General: No scleral icterus.     Extraocular Movements: Extraocular movements intact.      Pupils: Pupils are equal, round, and reactive to light.   Cardiovascular:      Rate and Rhythm: Normal rate and regular rhythm.      Pulses: Normal pulses.      Heart sounds: Normal heart sounds.   Pulmonary:      Effort: Pulmonary effort is normal.      Breath sounds: Normal breath sounds.   Abdominal:      General: Bowel sounds are normal. There is no distension.      Palpations: Abdomen is soft.      Tenderness: There is no abdominal tenderness. There is no guarding.   Musculoskeletal:         General: Normal range of motion.      Cervical back: Normal range of motion. No rigidity.      Right lower leg: No edema.      Left lower leg: No edema.   Skin:     General: Skin is warm and dry.      Capillary Refill: Capillary refill takes less than 2 seconds.   Neurological:      General: No focal deficit present.      Mental Status: She is alert and oriented to person, place, and time.   Psychiatric:         Mood and Affect: Mood normal.         Behavior: Behavior normal.         CRANIAL NERVES     CN III, IV, VI   Pupils are equal, round, and reactive to light.     Significant Labs: All pertinent labs within the past 24 hours have been reviewed.  CBC:   Recent Labs   Lab 06/17/22  1318 06/18/22  0631   WBC 8.06 7.88   HGB 16.3* 16.2*   HCT 47.5 47.9    223     CMP:   Recent Labs   Lab 06/17/22  1318 06/18/22  0631    140   K 3.6 4.0    105   CO2 29 30*   * 113*   BUN 15 14   CREATININE 1.0 1.1   CALCIUM 8.9 9.0   PROT 7.5 7.1   ALBUMIN 3.9 3.7   BILITOT 0.8 0.7   ALKPHOS 121 113   AST 66* 46*   * 108*   ANIONGAP 5* 5*   EGFRNONAA >60.0 59.1*     Coagulation:   Recent Labs   Lab 06/17/22  1318   INR 1.0     Lipid Panel:   Recent Labs   Lab 06/17/22  1318   CHOL 184   HDL 45   LDLCALC 109.2   TRIG 149   CHOLHDL 24.5     POCT Glucose:   Recent Labs   Lab 06/17/22  1315  06/17/22  1921   POCTGLUCOSE 124* 113*     TSH:   Recent Labs   Lab 06/17/22  1318   TSH 1.240     Lab Results   Component Value Date    [id number] Negative 06/17/2022       Significant Imaging: I have reviewed all pertinent imaging results/findings within the past 24 hours.    MRI Brain Without Contrast  Narrative: EXAMINATION:  MRI BRAIN WITHOUT CONTRAST    CLINICAL HISTORY:  Stroke, follow up;    TECHNIQUE:  MRI of the brain was performed in multiple planes and sequences.    COMPARISON:  Head CT 06/17/2022.    FINDINGS:  Acute infarct right frontal lobe with mild local mass effect.  No other infarct identified.  No hydrocephalus.  No mass visualized.  Mild chronic microvascular white matter ischemic change.  Unremarkable pituitary.  Impression: Acute right frontal infarct mild local mass effect.    Electronically signed by: Golden Santos MD  Date:    06/17/2022  Time:    16:11  CT Head Without Contrast  Narrative: EXAMINATION:  CT HEAD WITHOUT CONTRAST    CLINICAL HISTORY:  Neuro deficit, acute, stroke suspected;    TECHNIQUE:  Axial CT images were obtained. Iterative reconstruction technique was used.  CT/cardiac nuclear exam/s in prior 12 months: 0.    COMPARISON:  CT head without IV contrast 05/27/2021.    FINDINGS:  There is an evolving infarct in the right frontal lobe.  No acute intracranial hemorrhage.  No ventricular dilatation or abnormal extra-axial fluid collection.  No osseous abnormality.  Impression: An evolving infarct in the right frontal lobe.    No acute intracranial hemorrhage.    Electronically signed by: Jack Crouch MD  Date:    06/17/2022  Time:    13:27

## 2022-06-18 NOTE — PLAN OF CARE
[facility]  Discharge Final Note    Primary Care Provider: Karmen Bradford MD    Expected Discharge Date: 6/18/2022    Final Discharge Note (most recent)     Final Note - 06/18/22 1139        Final Note    Assessment Type Final Discharge Note     Anticipated Discharge Disposition Home or Self Care     Hospital Resources/Appts/Education Provided Provided patient/caregiver with written discharge plan information   Patient to FU with Dr. Bradford one week.  office is closed now, instruct patient to make appointment.       Post-Acute Status    Discharge Delays None known at this time                 Important Message from Medicare             Contact Info     Karmen Bradford MD   Specialty: Internal Medicine   Relationship: PCP - General    [address]  [address]  [place] [facility]  [facility] [address]   Phone: [phone]       Next Steps: Schedule an appointment as soon as possible for a visit in 1 week(s)      Patient is discharging to home, will need to FU with Dr. Bradford 1 week.  Patient to discharge with written discharge instructions.

## 2022-06-18 NOTE — DISCHARGE INSTRUCTIONS
We have started you on Eliquis and Crestor. Follow-up with Dr. Bradford for refills of these medications.    Monitor your blood pressure at home.    Repeat labs one week after discharge. These have been ordered at the hospital.    If signs and symptoms worsen or new signs and symptoms develop, call your doctor or go to the ED.

## 2022-06-18 NOTE — HPI
"Pt is 48 y/o female with Hx of hypertension, CVA, and multiple blood clots to multiple areas including lungs five years ago, presented to [facility] ED yesterday with C/O "not feeling right" and having a headache. Pt states that she was scared she was having another stroke so she went to the ED. The ED reports c/o slurring of the speech and dizziness onset approximately 12:00pm on day of arrival. Pt reports Hx of CVA in May 2021, was placed on plavix then it was discontinued. Pt is not currently on any antiplatelet or anticoagulant therapy. Pt was found to have an acute right frontal infarct mild local mass effect per MRI and admitted. Pt started on anticoagulation therapy. This morning, pt has no residual neurological deficits.  "

## 2022-06-18 NOTE — DISCHARGE SUMMARY
"[facility] Medicine  Discharge Summary      Patient Name: Mallorie Diego  MRN: [id number]  Patient Class: IP- Inpatient  Admission Date: 6/17/2022  Hospital Length of Stay: 1 days  Discharge Date and Time:  06/18/2022 10:24 AM  Attending Physician: Odalys Wright MD   Discharging Provider: Marylu Carrion NP  Primary Care Provider: Karmen Bradford MD      HPI:   Pt is 46 y/o female with Hx of hypertension, CVA, and multiple blood clots to multiple areas including lungs five years ago, presented to [facility] ED yesterday with C/O "not feeling right" and having a headache. Pt states that she was scared she was having another stroke so she went to the ED. The ED reports c/o slurring of the speech and dizziness onset approximately 12:00pm on day of arrival. Pt reports Hx of CVA in May 2021, was placed on plavix then it was discontinued. Pt is not currently on any antiplatelet or anticoagulant therapy. Pt was found to have an acute right frontal infarct mild local mass effect per MRI and admitted. Pt started on anticoagulation therapy. This morning, pt has no residual neurological deficits.      * No surgery found *      Hospital Course:   6/18 SD: Pt reports headache, dizziness, slurred speech, and change in sensation to face are all resolved. Pt currently has no neurologic deficits. Plan to discharge home on anticoagulation and statin therapy with close outpatient follow-up.       Goals of Care Treatment Preferences:  Code Status: Full Code      Consults:   Consults (From admission, onward)        Status Ordering Provider     Consult to Telemedicine - Acute Stroke  Once        Provider:  Del Mistry MD    Completed FREDERICK GEIGER          * Cerebrovascular accident (CVA)  MRI indicates acute right frontal infarct mild local mass effect.  Pt currently has no neurologic deficits. Plan to discharge home with anticoagulation and statin therapies and close outpatient " follow-up.      Weakness on left side of face  Resolved      Uncontrolled hypertension  Continue home meds. Monitor blood pressure at home and follow-up with PCP.        Final Active Diagnoses:    Diagnosis Date Noted POA    PRINCIPAL PROBLEM:  Cerebrovascular accident (CVA) [I63.9] 06/16/2021 Yes    Weakness on left side of face [R29.810] 06/17/2022 Yes    Uncontrolled hypertension [I10] 05/02/2021 Yes      Problems Resolved During this Admission:       Discharged Condition: fair    Disposition:     Follow Up:   Follow-up Information     Liajamil Bradford MD. Schedule an appointment as soon as possible for a visit in 1 week(s).    Specialty: Internal Medicine  Contact information:  [address]  [address]  [place] [facility]  [facility] [address]  [phone]                       Patient Instructions:      CBC auto differential   Standing Status: Future Standing Exp. Date: 07/18/22     Comprehensive metabolic panel   Standing Status: Future Standing Exp. Date: 07/18/22     Diet Cardiac     Activity as tolerated       Significant Diagnostic Studies: Labs: All labs within the past 24 hours have been reviewed    Pending Diagnostic Studies:     None         Medications:  Reconciled Home Medications:      Medication List      START taking these medications    apixaban 5 mg Tab  Commonly known as: ELIQUIS  Take 1 tablet (5 mg total) by mouth 2 (two) times daily.     rosuvastatin 20 MG tablet  Commonly known as: CRESTOR  Take 1 tablet (20 mg total) by mouth once daily.        CONTINUE taking these medications    albuterol 90 mcg/actuation inhaler  Commonly known as: PROVENTIL/VENTOLIN HFA  Inhale 2 puffs into the lungs every 6 (six) hours as needed for Wheezing.     aspirin 81 MG EC tablet  Commonly known as: ECOTRIN  Take 1 tablet (81 mg total) by mouth once daily.     butalbital-acetaminophen-caffeine -40 mg -40 mg per tablet  Commonly known as: FIORICET, ESGIC  Take 1 tablet by  mouth every 4 to 6 hours as needed for Headaches.     clonazePAM 2 MG Tab  Commonly known as: KlonoPIN  One table one to two times daily ONLY as needed for severe anxiety.     * cloNIDine 0.2 MG tablet  Commonly known as: CATAPRES  Take 0.1 mg by mouth 2 (two) times daily.     * cloNIDine 0.1 MG tablet  Commonly known as: CATAPRES  Take 0.1 mg by mouth 2 (two) times daily.     labetaloL 100 MG tablet  Commonly known as: NORMODYNE  Take 200 mg by mouth 2 (two) times daily.         * This list has 2 medication(s) that are the same as other medications prescribed for you. Read the directions carefully, and ask your doctor or other care provider to review them with you.            STOP taking these medications    oxyCODONE 10 mg Tab immediate release tablet  Commonly known as: ROXICODONE        ASK your doctor about these medications    diazePAM 5 MG tablet  Commonly known as: VALIUM  Take 2 tablets (10 mg total) by mouth every 8 (eight) hours as needed for Anxiety.            Indwelling Lines/Drains at time of discharge:   Lines/Drains/Airways     None                 Time spent on the discharge of patient: >30 minutes         Marylu Carrion NP  Department of Hospital Medicine  [facility]

## 2022-06-18 NOTE — H&P
"[facility] Medicine  History & Physical    Patient Name: Mallorie Diego  MRN: [id number]  Patient Class: IP- Inpatient  Admission Date: 2022  Attending Physician: Odalys Wright MD   Primary Care Provider: Karmen Bradford MD         Patient information was obtained from patient and ER records.     Subjective:     Principal Problem:Cerebrovascular accident (CVA)    Chief Complaint:   Chief Complaint   Patient presents with    Speech Problem     Pt stated that at approx noon she began experiencing slurred speech / dizziness/ headache. Hx CVA May 2021. Pt ambulatory upon arrival to ED.         HPI: Pt is 46 y/o female with Hx of hypertension, CVA, and multiple blood clots to multiple areas including lungs five years ago, presented to [facility] ED yesterday with C/O "not feeling right" and having a headache. Pt states that she was scared she was having another stroke so she went to the ED. The ED reports c/o slurring of the speech and dizziness onset approximately 12:00pm on day of arrival. Pt reports Hx of CVA in May 2021, was placed on plavix then it was discontinued. Pt is not currently on any antiplatelet or anticoagulant therapy. Pt was found to have an acute right frontal infarct mild local mass effect per MRI and admitted. Pt started on anticoagulation therapy. This morning, pt has no residual neurological deficits.      Past Medical History:   Diagnosis Date    Anxiety     Depression     DVT (deep venous thrombosis)     Headache(784.0)     Hepatitis     Hep C ab    History of blood clots     Hypertension     Insomnia     Pulmonary embolism     Seizures     Smoker     Stroke        Past Surgical History:   Procedure Laterality Date    BREAST BIOPSY Left     benign    BREAST MASS EXCISION       SECTION      CHOLECYSTECTOMY      FEMORAL ARTERY STENT      HYSTERECTOMY      LYMPHADENECTOMY      OOPHORECTOMY      UPPER GASTROINTESTINAL ENDOSCOPY         Review of " patient's allergies indicates:   Allergen Reactions    Tegretol [carbamazepine] Swelling    Lamictal [lamotrigine] Rash       No current facility-administered medications on file prior to encounter.     Current Outpatient Medications on File Prior to Encounter   Medication Sig    albuterol (PROVENTIL/VENTOLIN HFA) 90 mcg/actuation inhaler Inhale 2 puffs into the lungs every 6 (six) hours as needed for Wheezing.    cloNIDine (CATAPRES) 0.2 MG tablet Take 0.1 mg by mouth 2 (two) times daily.    labetaloL (NORMODYNE) 100 MG tablet Take 200 mg by mouth 2 (two) times daily.    aspirin (ECOTRIN) 81 MG EC tablet Take 1 tablet (81 mg total) by mouth once daily.    butalbital-acetaminophen-caffeine -40 mg (FIORICET, ESGIC) -40 mg per tablet Take 1 tablet by mouth every 4 to 6 hours as needed for Headaches.    clonazePAM (KLONOPIN) 2 MG Tab One table one to two times daily ONLY as needed for severe anxiety.    cloNIDine (CATAPRES) 0.1 MG tablet Take 0.1 mg by mouth 2 (two) times daily.    diazePAM (VALIUM) 5 MG tablet Take 2 tablets (10 mg total) by mouth every 8 (eight) hours as needed for Anxiety.    oxyCODONE (ROXICODONE) 10 mg Tab immediate release tablet Take 10 mg by mouth 2 (two) times daily as needed.     Family History       Problem Relation (Age of Onset)    Brain cancer Father    Breast cancer Sister    Cancer Father    Heart attack Mother    Heart disease Mother    Lung cancer Father    No Known Problems Daughter, Maternal Aunt, Maternal Uncle, Paternal Aunt, Paternal Uncle, Maternal Grandmother, Maternal Grandfather, Paternal Grandmother, Paternal Grandfather    Ovarian cancer Mother    Stroke Mother, Sister          Tobacco Use    Smoking status: Current Every Day Smoker     Packs/day: 0.50     Types: Cigarettes    Smokeless tobacco: Never Used   Substance and Sexual Activity    Alcohol use: No     Alcohol/week: 0.0 standard drinks    Drug use: No    Sexual activity: Not on file      Review of Systems   Constitutional:  Negative for activity change, chills and fever.   HENT: Negative.     Respiratory:  Negative for cough, chest tightness and shortness of breath.    Cardiovascular:  Negative for chest pain, palpitations and leg swelling.   Gastrointestinal:  Negative for abdominal pain, constipation, diarrhea, nausea and vomiting.   Endocrine: Negative.    Genitourinary:  Negative for difficulty urinating and flank pain.   Musculoskeletal: Negative.    Skin: Negative.    Neurological:  Negative for dizziness, facial asymmetry, speech difficulty, weakness, light-headedness, numbness and headaches.   Psychiatric/Behavioral:  Negative for agitation, confusion and hallucinations. The patient is not nervous/anxious.    Objective:     Vital Signs (Most Recent):  Temp: 98.5 °F (36.9 °C) (06/18/22 0756)  Pulse: 62 (06/18/22 0949)  Resp: 18 (06/18/22 0826)  BP: (!) 121/58 (06/18/22 0949)  SpO2: 96 % (06/18/22 0826)   Vital Signs (24h Range):  Temp:  [97.7 °F (36.5 °C)-98.5 °F (36.9 °C)] 98.5 °F (36.9 °C)  Pulse:  [58-86] 62  Resp:  [16-20] 18  SpO2:  [96 %-100 %] 96 %  BP: (121-231)/() 121/58     Weight: 105.2 kg (232 lb)  Body mass index is 38.61 kg/m².    Physical Exam  Vitals and nursing note reviewed.   Constitutional:       Appearance: She is obese.   HENT:      Head: Normocephalic.      Mouth/Throat:      Mouth: Mucous membranes are moist.      Pharynx: Oropharynx is clear.   Eyes:      General: No scleral icterus.     Extraocular Movements: Extraocular movements intact.      Pupils: Pupils are equal, round, and reactive to light.   Cardiovascular:      Rate and Rhythm: Normal rate and regular rhythm.      Pulses: Normal pulses.      Heart sounds: Normal heart sounds.   Pulmonary:      Effort: Pulmonary effort is normal.      Breath sounds: Normal breath sounds.   Abdominal:      General: Bowel sounds are normal. There is no distension.      Palpations: Abdomen is soft.      Tenderness:  There is no abdominal tenderness. There is no guarding.   Musculoskeletal:         General: Normal range of motion.      Cervical back: Normal range of motion. No rigidity.      Right lower leg: No edema.      Left lower leg: No edema.   Skin:     General: Skin is warm and dry.      Capillary Refill: Capillary refill takes less than 2 seconds.   Neurological:      General: No focal deficit present.      Mental Status: She is alert and oriented to person, place, and time.   Psychiatric:         Mood and Affect: Mood normal.         Behavior: Behavior normal.         CRANIAL NERVES     CN III, IV, VI   Pupils are equal, round, and reactive to light.     Significant Labs: All pertinent labs within the past 24 hours have been reviewed.  CBC:   Recent Labs   Lab 06/17/22  1318 06/18/22  0631   WBC 8.06 7.88   HGB 16.3* 16.2*   HCT 47.5 47.9    223     CMP:   Recent Labs   Lab 06/17/22  1318 06/18/22  0631    140   K 3.6 4.0    105   CO2 29 30*   * 113*   BUN 15 14   CREATININE 1.0 1.1   CALCIUM 8.9 9.0   PROT 7.5 7.1   ALBUMIN 3.9 3.7   BILITOT 0.8 0.7   ALKPHOS 121 113   AST 66* 46*   * 108*   ANIONGAP 5* 5*   EGFRNONAA >60.0 59.1*     Coagulation:   Recent Labs   Lab 06/17/22  1318   INR 1.0     Lipid Panel:   Recent Labs   Lab 06/17/22  1318   CHOL 184   HDL 45   LDLCALC 109.2   TRIG 149   CHOLHDL 24.5     POCT Glucose:   Recent Labs   Lab 06/17/22  1315 06/17/22  1921   POCTGLUCOSE 124* 113*     TSH:   Recent Labs   Lab 06/17/22  1318   TSH 1.240     Lab Results   Component Value Date    [id number] Negative 06/17/2022       Significant Imaging: I have reviewed all pertinent imaging results/findings within the past 24 hours.    MRI Brain Without Contrast  Narrative: EXAMINATION:  MRI BRAIN WITHOUT CONTRAST    CLINICAL HISTORY:  Stroke, follow up;    TECHNIQUE:  MRI of the brain was performed in multiple planes and sequences.    COMPARISON:  Head CT 06/17/2022.    FINDINGS:  Acute  infarct right frontal lobe with mild local mass effect.  No other infarct identified.  No hydrocephalus.  No mass visualized.  Mild chronic microvascular white matter ischemic change.  Unremarkable pituitary.  Impression: Acute right frontal infarct mild local mass effect.    Electronically signed by: Golden Santos MD  Date:    06/17/2022  Time:    16:11  CT Head Without Contrast  Narrative: EXAMINATION:  CT HEAD WITHOUT CONTRAST    CLINICAL HISTORY:  Neuro deficit, acute, stroke suspected;    TECHNIQUE:  Axial CT images were obtained. Iterative reconstruction technique was used.  CT/cardiac nuclear exam/s in prior 12 months: 0.    COMPARISON:  CT head without IV contrast 05/27/2021.    FINDINGS:  There is an evolving infarct in the right frontal lobe.  No acute intracranial hemorrhage.  No ventricular dilatation or abnormal extra-axial fluid collection.  No osseous abnormality.  Impression: An evolving infarct in the right frontal lobe.    No acute intracranial hemorrhage.    Electronically signed by: Jack Crouch MD  Date:    06/17/2022  Time:    13:27    Assessment/Plan:     * Cerebrovascular accident (CVA)  MRI indicates acute right frontal infarct mild local mass effect. Start patient on anticoagulation and statin therapies.      Weakness on left side of face  Resolved      Uncontrolled hypertension  Blood pressure stable        VTE Risk Mitigation (From admission, onward)         Ordered     apixaban tablet 5 mg  2 times daily         06/17/22 1706     IP VTE HIGH RISK PATIENT  Once         06/17/22 1707     Place sequential compression device  Until discontinued         06/17/22 1707                   Marylu Carrion NP  Department of Hospital Medicine   [facility]

## 2022-07-29 ENCOUNTER — LAB VISIT (OUTPATIENT)
Dept: LAB | Facility: HOSPITAL | Age: 47
End: 2022-07-29
Attending: INTERNAL MEDICINE
Payer: MEDICAID

## 2022-07-29 DIAGNOSIS — I10 HTN (HYPERTENSION): ICD-10-CM

## 2022-07-29 DIAGNOSIS — E11.9 DM (DIABETES MELLITUS): Primary | ICD-10-CM

## 2022-07-29 DIAGNOSIS — E66.9 OBESE: ICD-10-CM

## 2022-07-29 LAB
ALBUMIN SERPL BCP-MCNC: 4 G/DL (ref 3.5–5.2)
ALP SERPL-CCNC: 104 U/L (ref 55–135)
ALT SERPL W/O P-5'-P-CCNC: 63 U/L (ref 10–44)
ANION GAP SERPL CALC-SCNC: 6 MMOL/L (ref 8–16)
AST SERPL-CCNC: 25 U/L (ref 10–40)
BACTERIA #/AREA URNS HPF: NEGATIVE /HPF
BASOPHILS # BLD AUTO: 0.05 K/UL (ref 0–0.2)
BASOPHILS NFR BLD: 0.5 % (ref 0–1.9)
BILIRUB SERPL-MCNC: 0.6 MG/DL (ref 0.1–1)
BILIRUB UR QL STRIP: NEGATIVE
BUN SERPL-MCNC: 10 MG/DL (ref 6–20)
CALCIUM SERPL-MCNC: 9 MG/DL (ref 8.7–10.5)
CHLORIDE SERPL-SCNC: 106 MMOL/L (ref 95–110)
CHOLEST SERPL-MCNC: 184 MG/DL (ref 120–199)
CHOLEST/HDLC SERPL: 3.9 {RATIO} (ref 2–5)
CLARITY UR: CLEAR
CO2 SERPL-SCNC: 26 MMOL/L (ref 23–29)
COLOR UR: YELLOW
CREAT SERPL-MCNC: 1 MG/DL (ref 0.5–1.4)
DIFFERENTIAL METHOD: ABNORMAL
EOSINOPHIL # BLD AUTO: 0.1 K/UL (ref 0–0.5)
EOSINOPHIL NFR BLD: 0.9 % (ref 0–8)
ERYTHROCYTE [DISTWIDTH] IN BLOOD BY AUTOMATED COUNT: 12.4 % (ref 11.5–14.5)
EST. GFR  (AFRICAN AMERICAN): >60 ML/MIN/1.73 M^2
EST. GFR  (NON AFRICAN AMERICAN): >60 ML/MIN/1.73 M^2
ESTIMATED AVG GLUCOSE: 111 MG/DL (ref 68–131)
GLUCOSE SERPL-MCNC: 115 MG/DL (ref 70–110)
GLUCOSE UR QL STRIP: NEGATIVE
HBA1C MFR BLD: 5.5 % (ref 4–5.6)
HCT VFR BLD AUTO: 48.4 % (ref 37–48.5)
HDLC SERPL-MCNC: 47 MG/DL (ref 40–75)
HDLC SERPL: 25.5 % (ref 20–50)
HGB BLD-MCNC: 16.6 G/DL (ref 12–16)
HGB UR QL STRIP: NEGATIVE
HYALINE CASTS #/AREA URNS LPF: 0.8 /LPF
IMM GRANULOCYTES # BLD AUTO: 0.03 K/UL (ref 0–0.04)
IMM GRANULOCYTES NFR BLD AUTO: 0.3 % (ref 0–0.5)
KETONES UR QL STRIP: NEGATIVE
LDLC SERPL CALC-MCNC: 115.2 MG/DL (ref 63–159)
LEUKOCYTE ESTERASE UR QL STRIP: NEGATIVE
LYMPHOCYTES # BLD AUTO: 2.5 K/UL (ref 1–4.8)
LYMPHOCYTES NFR BLD: 24.7 % (ref 18–48)
MCH RBC QN AUTO: 32 PG (ref 27–31)
MCHC RBC AUTO-ENTMCNC: 34.3 G/DL (ref 32–36)
MCV RBC AUTO: 93 FL (ref 82–98)
MICROSCOPIC COMMENT: ABNORMAL
MONOCYTES # BLD AUTO: 0.7 K/UL (ref 0.3–1)
MONOCYTES NFR BLD: 7.3 % (ref 4–15)
NEUTROPHILS # BLD AUTO: 6.6 K/UL (ref 1.8–7.7)
NEUTROPHILS NFR BLD: 66.3 % (ref 38–73)
NITRITE UR QL STRIP: NEGATIVE
NONHDLC SERPL-MCNC: 137 MG/DL
NRBC BLD-RTO: 0 /100 WBC
PH UR STRIP: 7 [PH] (ref 5–8)
PLATELET # BLD AUTO: 244 K/UL (ref 150–450)
PMV BLD AUTO: 10.9 FL (ref 9.2–12.9)
POTASSIUM SERPL-SCNC: 3.6 MMOL/L (ref 3.5–5.1)
PROT SERPL-MCNC: 7.8 G/DL (ref 6–8.4)
PROT UR QL STRIP: ABNORMAL
RBC # BLD AUTO: 5.19 M/UL (ref 4–5.4)
RBC #/AREA URNS HPF: 4 /HPF (ref 0–4)
SODIUM SERPL-SCNC: 138 MMOL/L (ref 136–145)
SP GR UR STRIP: 1.02 (ref 1–1.03)
SQUAMOUS #/AREA URNS HPF: 4 /HPF
TRIGL SERPL-MCNC: 109 MG/DL (ref 30–150)
TSH SERPL DL<=0.005 MIU/L-ACNC: 2.39 UIU/ML (ref 0.4–4)
URN SPEC COLLECT METH UR: ABNORMAL
UROBILINOGEN UR STRIP-ACNC: 1 EU/DL
WBC # BLD AUTO: 9.91 K/UL (ref 3.9–12.7)
WBC #/AREA URNS HPF: 3 /HPF (ref 0–5)

## 2022-07-29 PROCEDURE — 83036 HEMOGLOBIN GLYCOSYLATED A1C: CPT | Performed by: INTERNAL MEDICINE

## 2022-07-29 PROCEDURE — 36415 COLL VENOUS BLD VENIPUNCTURE: CPT | Performed by: INTERNAL MEDICINE

## 2022-07-29 PROCEDURE — 80053 COMPREHEN METABOLIC PANEL: CPT | Performed by: INTERNAL MEDICINE

## 2022-07-29 PROCEDURE — 85025 COMPLETE CBC W/AUTO DIFF WBC: CPT | Performed by: INTERNAL MEDICINE

## 2022-07-29 PROCEDURE — 81000 URINALYSIS NONAUTO W/SCOPE: CPT | Performed by: INTERNAL MEDICINE

## 2022-07-29 PROCEDURE — 80061 LIPID PANEL: CPT | Performed by: INTERNAL MEDICINE

## 2022-07-29 PROCEDURE — 84443 ASSAY THYROID STIM HORMONE: CPT | Performed by: INTERNAL MEDICINE

## 2022-08-15 ENCOUNTER — HOSPITAL ENCOUNTER (OUTPATIENT)
Dept: RADIOLOGY | Facility: HOSPITAL | Age: 47
Discharge: HOME | End: 2022-08-15
Attending: INTERNAL MEDICINE
Payer: MEDICAID

## 2022-08-15 VITALS — BODY MASS INDEX: 38.65 KG/M2 | WEIGHT: 232 LBS | HEIGHT: 65 IN

## 2022-08-15 DIAGNOSIS — Z12.31 ENCOUNTER FOR SCREENING MAMMOGRAM FOR MALIGNANT NEOPLASM OF BREAST: ICD-10-CM

## 2022-08-15 PROCEDURE — 77067 SCR MAMMO BI INCL CAD: CPT | Mod: TC

## 2022-09-15 ENCOUNTER — HOSPITAL ENCOUNTER (EMERGENCY)
Facility: HOSPITAL | Age: 47
Discharge: HOME | End: 2022-09-15
Attending: EMERGENCY MEDICINE
Payer: MEDICAID

## 2022-09-15 VITALS
TEMPERATURE: 97 F | BODY MASS INDEX: 39.15 KG/M2 | WEIGHT: 235 LBS | HEIGHT: 65 IN | SYSTOLIC BLOOD PRESSURE: 129 MMHG | OXYGEN SATURATION: 97 % | RESPIRATION RATE: 20 BRPM | HEART RATE: 96 BPM | DIASTOLIC BLOOD PRESSURE: 80 MMHG

## 2022-09-15 DIAGNOSIS — U07.1 COVID-19: Primary | ICD-10-CM

## 2022-09-15 DIAGNOSIS — U07.1 COVID-19 VIRUS DETECTED: ICD-10-CM

## 2022-09-15 LAB
CTP QC/QA: YES
SARS-COV-2 RDRP RESP QL NAA+PROBE: POSITIVE

## 2022-09-15 PROCEDURE — 25000003 PHARM REV CODE 250: Performed by: NURSE PRACTITIONER

## 2022-09-15 PROCEDURE — U0002 COVID-19 LAB TEST NON-CDC: HCPCS | Performed by: NURSE PRACTITIONER

## 2022-09-15 PROCEDURE — 99284 EMERGENCY DEPT VISIT MOD MDM: CPT

## 2022-09-15 RX ORDER — DICYCLOMINE HYDROCHLORIDE 20 MG/1
20 TABLET ORAL 2 TIMES DAILY
Qty: 6 TABLET | Refills: 0 | Status: SHIPPED | OUTPATIENT
Start: 2022-09-15 | End: 2022-09-18

## 2022-09-15 RX ORDER — ALBUTEROL SULFATE 90 UG/1
2 AEROSOL, METERED RESPIRATORY (INHALATION) EVERY 6 HOURS PRN
Qty: 18 G | Refills: 0 | Status: SHIPPED | OUTPATIENT
Start: 2022-09-15 | End: 2023-09-15

## 2022-09-15 RX ORDER — ONDANSETRON 4 MG/1
4 TABLET, ORALLY DISINTEGRATING ORAL
Status: COMPLETED | OUTPATIENT
Start: 2022-09-15 | End: 2022-09-15

## 2022-09-15 RX ORDER — PROMETHAZINE HYDROCHLORIDE AND DEXTROMETHORPHAN HYDROBROMIDE 6.25; 15 MG/5ML; MG/5ML
5 SYRUP ORAL EVERY 4 HOURS PRN
Qty: 118 ML | Refills: 0 | Status: SHIPPED | OUTPATIENT
Start: 2022-09-15 | End: 2022-09-25

## 2022-09-15 RX ADMIN — ONDANSETRON 4 MG: 4 TABLET, ORALLY DISINTEGRATING ORAL at 04:09

## 2022-09-15 NOTE — ED PROVIDER NOTES
Encounter Date: 9/15/2022       History     Chief Complaint   Patient presents with    COVID-19 Concerns     Pt stated that she has headache / fever / diarrhea for the past couple days. Son has covid.      This is a 47-year-old white female with a history of hypertension, smoker presents to the emergency department with concerns regarding COVID-19 after known exposure.  Patient states that yesterday she began experiencing generalize headache, stuffy/runny nose, nonproductive cough, and diarrhea.  She denies vomiting or known fever.    Review of patient's allergies indicates:   Allergen Reactions    Tegretol [carbamazepine] Swelling    Lamictal [lamotrigine] Rash     Past Medical History:   Diagnosis Date    Anxiety     Depression     DVT (deep venous thrombosis)     Headache(784.0)     Hepatitis     Hep C ab    History of blood clots     Hypertension     Insomnia     Pulmonary embolism     Seizures     Smoker     Stroke      Past Surgical History:   Procedure Laterality Date    BREAST BIOPSY Left     benign    BREAST MASS EXCISION       SECTION      CHOLECYSTECTOMY      FEMORAL ARTERY STENT      HYSTERECTOMY      LYMPHADENECTOMY      OOPHORECTOMY      UPPER GASTROINTESTINAL ENDOSCOPY       Family History   Problem Relation Age of Onset    Stroke Mother     Heart disease Mother     Heart attack Mother     Ovarian cancer Mother     Cancer Father     Lung cancer Father     Brain cancer Father     Stroke Sister     Breast cancer Sister     No Known Problems Daughter     No Known Problems Maternal Aunt     No Known Problems Maternal Uncle     No Known Problems Paternal Aunt     No Known Problems Paternal Uncle     No Known Problems Maternal Grandmother     No Known Problems Maternal Grandfather     No Known Problems Paternal Grandmother     No Known Problems Paternal Grandfather     BRCA 1/2 Neg Hx      Social History     Tobacco Use    Smoking status: Every Day     Packs/day: 0.50     Types: Cigarettes     Smokeless tobacco: Never   Substance Use Topics    Alcohol use: No     Alcohol/week: 0.0 standard drinks    Drug use: No     Review of Systems   Constitutional:  Negative for fever.   HENT:  Positive for congestion and rhinorrhea.    Respiratory:  Positive for cough and chest tightness.    Gastrointestinal:  Positive for diarrhea. Negative for abdominal pain, nausea and vomiting.   Musculoskeletal:  Positive for myalgias.   Neurological:  Positive for headaches.     Physical Exam     Initial Vitals   BP Pulse Resp Temp SpO2   09/15/22 1651 09/15/22 1633 09/15/22 1633 09/15/22 1633 09/15/22 1633   129/80 96 20 97.3 °F (36.3 °C) 97 %      MAP       --                Physical Exam    Nursing note and vitals reviewed.  Constitutional: She appears well-developed and well-nourished. She is active. No distress.   HENT:   Head: Normocephalic and atraumatic.   Mouth/Throat: Oropharynx is clear and moist. No oropharyngeal exudate.   Eyes: EOM are normal. Pupils are equal, round, and reactive to light.   Neck: Neck supple.   Normal range of motion.  Cardiovascular:  Normal rate, regular rhythm and normal heart sounds.           Pulmonary/Chest: No respiratory distress. She has wheezes (mild intermittent expiratory wheezing).   Musculoskeletal:         General: Normal range of motion.      Cervical back: Normal range of motion and neck supple.     Neurological: She is alert and oriented to person, place, and time. GCS score is 15. GCS eye subscore is 4. GCS verbal subscore is 5. GCS motor subscore is 6.   Skin: Skin is warm and dry. Capillary refill takes less than 2 seconds.   Psychiatric: She has a normal mood and affect. Her behavior is normal. Thought content normal.       ED Course   Procedures  Labs Reviewed   SARS-COV-2 RDRP GENE - Abnormal; Notable for the following components:       Result Value    POC Rapid COVID Positive (*)     All other components within normal limits    Narrative:     This test utilizes isothermal  nucleic acid amplification   technology to detect the SARS-CoV-2 RdRp nucleic acid segment.   The analytical sensitivity (limit of detection) is 125 genome   equivalents/mL.   A POSITIVE result implies infection with the SARS-CoV-2 virus;   the patient is presumed to be contagious.     A NEGATIVE result means that SARS-CoV-2 nucleic acids are not   present above the limit of detection. A NEGATIVE result should be   treated as presumptive. It does not rule out the possibility of   COVID-19 and should not be the sole basis for treatment decisions.   If COVID-19 is strongly suspected based on clinical and exposure   history, re-testing using an alternate molecular assay should be   considered.   This test is only for use under the Food and Drug   Administration s Emergency Use Authorization (EUA).   Commercial kits are provided by [employer].   Performance characteristics of the EUA have been independently   verified by [facility] Department of   Pathology and Laboratory Medicine.   _________________________________________________________________   The authorized Fact Sheet for Healthcare Providers and the authorized Fact   Sheet for Patients of the ID NOW COVID-19 are available on the FDA   website:     https://www.fda.gov/media/801233/download  https://www.fda.gov/media/701067/download                  Imaging Results    None          Medications   ondansetron disintegrating tablet 4 mg (4 mg Oral Given 9/15/22 1650)                   ED Course as of 09/15/22 1720   Thu Sep 15, 2022   1717 Rapid COVID-19 test positive [CB]      ED Course User Index  [CB] Kandy Forrest NP                 Clinical Impression:   Final diagnoses:  [U07.1] COVID-19 (Primary)        ED Disposition Condition    Discharge Stable          ED Prescriptions       Medication Sig Dispense Start Date End Date Auth. Provider    albuterol (PROVENTIL/VENTOLIN HFA) 90 mcg/actuation inhaler Inhale 2 puffs into the lungs every  6 (six) hours as needed for Wheezing. 18 g 9/15/2022 9/15/2023 Kandy Forrest NP    promethazine-dextromethorphan (PROMETHAZINE-DM) 6.25-15 mg/5 mL Syrp Take 5 mLs by mouth every 4 (four) hours as needed. 118 mL 9/15/2022 9/25/2022 Kandy Forrest NP    dicyclomine (BENTYL) 20 mg tablet Take 1 tablet (20 mg total) by mouth 2 (two) times daily. for 3 days 6 tablet 9/15/2022 9/18/2022 Kandy Forrest NP          Follow-up Information       Follow up With Specialties Details Why Contact Info    PCP Follow UP  Schedule an appointment as soon as possible for a visit in 2 days for follow-up, for re-evaluation of today's complaint              Kandy Forrest NP  09/15/22 6359

## 2022-09-15 NOTE — Clinical Note
"Mallorie"Aurora Diego was seen and treated in our emergency department on 9/15/2022.     COVID-19 is present in our communities across the state. There is limited testing for COVID at this time, so not all patients can be tested. In this situation, your employee meets the following criteria:    Mallorie Diego has met the criteria for COVID-19 testing and has a POSITIVE result. She can return to work once they are asymptomatic for 24 hours without the use of fever reducing medications AND at least five days from the first positive result. A mask is recommended for 5 days post quarantine.     If you have any questions or concerns, or if I can be of further assistance, please do not hesitate to contact me.    Sincerely,             Kandy Forrest NP"

## 2022-09-16 ENCOUNTER — NURSE TRIAGE (OUTPATIENT)
Dept: ADMINISTRATIVE | Facility: CLINIC | Age: 47
End: 2022-09-16
Payer: MEDICAID

## 2022-09-16 NOTE — TELEPHONE ENCOUNTER
Pt called and no answer left message on vm and to call back ooc if any problems.          Reason for Disposition   Message left on unidentified voice mail. Phone number verified.    Protocols used: No Contact or Duplicate Contact Call-A-[place]

## 2022-09-17 ENCOUNTER — NURSE TRIAGE (OUTPATIENT)
Dept: ADMINISTRATIVE | Facility: CLINIC | Age: 47
End: 2022-09-17
Payer: MEDICAID

## 2022-09-17 NOTE — TELEPHONE ENCOUNTER
Pt responded to [facility] text message. Pt called x2 with no contact made. Unable to leave   Reason for Disposition   Caller has cancelled the call before the first contact    Protocols used: No Contact or Duplicate Contact Call-A-AH

## 2022-10-31 ENCOUNTER — HOSPITAL ENCOUNTER (EMERGENCY)
Facility: HOSPITAL | Age: 47
Discharge: HOME | End: 2022-10-31
Attending: STUDENT IN AN ORGANIZED HEALTH CARE EDUCATION/TRAINING PROGRAM
Payer: MEDICAID

## 2022-10-31 VITALS
OXYGEN SATURATION: 98 % | RESPIRATION RATE: 18 BRPM | HEART RATE: 101 BPM | DIASTOLIC BLOOD PRESSURE: 136 MMHG | SYSTOLIC BLOOD PRESSURE: 215 MMHG | TEMPERATURE: 100 F

## 2022-10-31 DIAGNOSIS — B34.9 VIRAL ILLNESS: ICD-10-CM

## 2022-10-31 DIAGNOSIS — I10 UNCONTROLLED HYPERTENSION: Primary | ICD-10-CM

## 2022-10-31 PROCEDURE — 99284 EMERGENCY DEPT VISIT MOD MDM: CPT | Mod: 25

## 2022-10-31 RX ORDER — LOPERAMIDE HYDROCHLORIDE 2 MG/1
2 CAPSULE ORAL 4 TIMES DAILY PRN
Qty: 12 CAPSULE | Refills: 0 | Status: SHIPPED | OUTPATIENT
Start: 2022-10-31 | End: 2022-11-10

## 2022-10-31 RX ORDER — ONDANSETRON 4 MG/1
4 TABLET, FILM COATED ORAL EVERY 6 HOURS
Qty: 12 TABLET | Refills: 0 | Status: SHIPPED | OUTPATIENT
Start: 2022-10-31

## 2022-10-31 NOTE — Clinical Note
"Mallorie"Aurora Diego was seen and treated in our emergency department on 10/31/2022.  She may return to school on 11/02/2022.      If you have any questions or concerns, please don't hesitate to call.      Klaus Fontana MD"

## 2022-11-01 ENCOUNTER — PATIENT OUTREACH (OUTPATIENT)
Dept: EMERGENCY MEDICINE | Facility: HOSPITAL | Age: 47
End: 2022-11-01
Payer: MEDICAID

## 2022-11-01 NOTE — ED PROVIDER NOTES
"Encounter Date: 10/31/2022       History     Chief Complaint   Patient presents with    COVID-19 Concerns     Sinus congestion, cough, diarrhea "for a few days." No fever or vomiting.      47-year-old female with history of uncontrolled hypertension presents with nasal congestion, cough, diarrhea for the past few days.  Mother has similar symptoms.  Denies any vomiting, shortness of breath, chest pain, headache, vomiting     Review of patient's allergies indicates:   Allergen Reactions    Tegretol [carbamazepine] Swelling    Lamictal [lamotrigine] Rash     Past Medical History:   Diagnosis Date    Anxiety     Depression     DVT (deep venous thrombosis)     Headache(784.0)     Hepatitis     Hep C ab    History of blood clots     Hypertension     Insomnia     Pulmonary embolism     Seizures     Smoker     Stroke      Past Surgical History:   Procedure Laterality Date    BREAST BIOPSY Left     benign    BREAST MASS EXCISION       SECTION      CHOLECYSTECTOMY      FEMORAL ARTERY STENT      HYSTERECTOMY      LYMPHADENECTOMY      OOPHORECTOMY      UPPER GASTROINTESTINAL ENDOSCOPY       Family History   Problem Relation Age of Onset    Stroke Mother     Heart disease Mother     Heart attack Mother     Ovarian cancer Mother     Cancer Father     Lung cancer Father     Brain cancer Father     Stroke Sister     Breast cancer Sister     No Known Problems Daughter     No Known Problems Maternal Aunt     No Known Problems Maternal Uncle     No Known Problems Paternal Aunt     No Known Problems Paternal Uncle     No Known Problems Maternal Grandmother     No Known Problems Maternal Grandfather     No Known Problems Paternal Grandmother     No Known Problems Paternal Grandfather     BRCA 1/2 Neg Hx      Social History     Tobacco Use    Smoking status: Every Day     Packs/day: 0.50     Types: Cigarettes    Smokeless tobacco: Never   Substance Use Topics    Alcohol use: No     Alcohol/week: 0.0 standard drinks    Drug use: " No     Review of Systems   Constitutional: Negative.    HENT:  Positive for congestion and postnasal drip.    Respiratory:  Positive for cough.    Cardiovascular: Negative.    Gastrointestinal:  Positive for diarrhea.   Genitourinary: Negative.    Musculoskeletal: Negative.    Skin: Negative.    Neurological: Negative.    Psychiatric/Behavioral: Negative.     All other systems reviewed and are negative.    Physical Exam     Initial Vitals [10/31/22 2203]   BP Pulse Resp Temp SpO2   (!) 222/128 101 18 99.9 °F (37.7 °C) 98 %      MAP       --         Physical Exam    Nursing note and vitals reviewed.  Constitutional: Vital signs are normal. She appears well-developed and well-nourished.   HENT:   Head: Normocephalic and atraumatic.   Eyes: Conjunctivae and lids are normal.   Neck: Trachea normal. Neck supple.   Cardiovascular:  Normal rate, regular rhythm, normal heart sounds, intact distal pulses and normal pulses.           No murmur heard.  Pulmonary/Chest: Breath sounds normal. No respiratory distress.   Abdominal: Abdomen is soft. Bowel sounds are normal.   Musculoskeletal:         General: Normal range of motion.      Cervical back: Neck supple.     Neurological: She is alert and oriented to person, place, and time. She has normal strength. No cranial nerve deficit or sensory deficit.   Skin: Skin is warm. Capillary refill takes less than 2 seconds.   Psychiatric: She has a normal mood and affect. Her speech is normal. Thought content normal.       ED Course   Procedures  Labs Reviewed - No data to display       Imaging Results    None          Medications - No data to display  Medical Decision Making:   Initial Assessment:   47-year-old female with history of uncontrolled hypertension presents with nasal congestion, cough, diarrhea for the past few days.  Will hypertensive otherwise stable vitals.  Asymptomatic hypertension.  Also has viral illness.  Would advise symptomatic treatment for viral illness.   Stressed importance of better blood pressure control.  Patient says that her blood pressure is always over 200 and is being followed by PCP and currently on 4 blood pressure medication which she is taking as prescribed            ED Course as of 10/31/22 2226   Mon Oct 31, 2022   2225 Patient continues to have elevated blood pressure but says that she has not taken her blood pressure medication at night because it makes her drowsy.  Patient remains asymptomatic hypertension.  Will discharge patient home recommending that she needs to follow up for better blood pressure [HD]      ED Course User Index  [HD] Klaus Fontana MD                 Clinical Impression:   Final diagnoses:  [I10] Uncontrolled hypertension (Primary)  [B34.9] Viral illness             Klaus Fontana MD  10/31/22 2226

## 2022-11-07 NOTE — PROGRESS NOTES
ED Navigator attempted to contact patient on 3 or more separate occasions, patient is unable to reach. ED Navigator to close encounter at this time.     Radha Mojica  ED Navigator- [place]  [phone]

## 2023-05-23 ENCOUNTER — HOSPITAL ENCOUNTER (EMERGENCY)
Facility: HOSPITAL | Age: 48
Discharge: HOME | End: 2023-05-23
Attending: EMERGENCY MEDICINE
Payer: MEDICAID

## 2023-05-23 VITALS
OXYGEN SATURATION: 98 % | DIASTOLIC BLOOD PRESSURE: 80 MMHG | RESPIRATION RATE: 18 BRPM | BODY MASS INDEX: 38.79 KG/M2 | HEIGHT: 65 IN | HEART RATE: 75 BPM | SYSTOLIC BLOOD PRESSURE: 162 MMHG | TEMPERATURE: 98 F | WEIGHT: 232.81 LBS

## 2023-05-23 DIAGNOSIS — I10 HYPERTENSION, UNSPECIFIED TYPE: Primary | ICD-10-CM

## 2023-05-23 PROCEDURE — 99283 EMERGENCY DEPT VISIT LOW MDM: CPT

## 2023-05-23 PROCEDURE — 25000003 PHARM REV CODE 250: Performed by: CLINICAL NURSE SPECIALIST

## 2023-05-23 RX ORDER — ACETAMINOPHEN 500 MG
1000 TABLET ORAL
Status: COMPLETED | OUTPATIENT
Start: 2023-05-23 | End: 2023-05-23

## 2023-05-23 RX ORDER — HYDRALAZINE HYDROCHLORIDE 25 MG/1
50 TABLET, FILM COATED ORAL ONCE
Status: COMPLETED | OUTPATIENT
Start: 2023-05-23 | End: 2023-05-23

## 2023-05-23 RX ADMIN — HYDRALAZINE HYDROCHLORIDE 50 MG: 25 TABLET ORAL at 09:05

## 2023-05-23 RX ADMIN — ACETAMINOPHEN 1000 MG: 500 TABLET ORAL at 09:05

## 2023-05-23 NOTE — ED PROVIDER NOTES
"Encounter Date: 2023       History     Chief Complaint   Patient presents with    Hypertension     Patient states she went to her PCP and was told to come to the ED "to get her pressure down"  C/o dizziness and headaches.      48-year-old female presents to the emergency room hypertension from her PCP office just prior.  /136, patient states she is does not take her morning doses of blood pressure medication because it makes her sleepy.  Reports only takes nighttime dose of blood pressure medications.  Patient states was at her PCP office this morning and was sent here for evaluation.  Patient reports dizziness and headache currently.  Patient did not take any medication for headache prior to arrival.  History of anxiety, depression, stroke, PE      Review of patient's allergies indicates:   Allergen Reactions    Tegretol [carbamazepine] Swelling    Lamictal [lamotrigine] Rash     Past Medical History:   Diagnosis Date    Anxiety     Depression     DVT (deep venous thrombosis)     Headache(784.0)     Hepatitis     Hep C ab    History of blood clots     Hypertension     Insomnia     Pulmonary embolism     Seizures     Smoker     Stroke      Past Surgical History:   Procedure Laterality Date    BREAST BIOPSY Left     benign    BREAST MASS EXCISION       SECTION      CHOLECYSTECTOMY      FEMORAL ARTERY STENT      HYSTERECTOMY      LYMPHADENECTOMY      OOPHORECTOMY      UPPER GASTROINTESTINAL ENDOSCOPY       Family History   Problem Relation Age of Onset    Stroke Mother     Heart disease Mother     Heart attack Mother     Ovarian cancer Mother     Cancer Father     Lung cancer Father     Brain cancer Father     Stroke Sister     Breast cancer Sister     No Known Problems Daughter     No Known Problems Maternal Aunt     No Known Problems Maternal Uncle     No Known Problems Paternal Aunt     No Known Problems Paternal Uncle     No Known Problems Maternal Grandmother     No Known Problems Maternal " Grandfather     No Known Problems Paternal Grandmother     No Known Problems Paternal Grandfather     BRCA 1/2 Neg Hx      Social History     Tobacco Use    Smoking status: Every Day     Packs/day: 0.50     Types: Cigarettes    Smokeless tobacco: Never   Substance Use Topics    Alcohol use: No     Alcohol/week: 0.0 standard drinks    Drug use: No     Review of Systems   Constitutional:  Negative for fever.   HENT:  Negative for sore throat.    Respiratory:  Negative for shortness of breath.    Cardiovascular:  Negative for chest pain.   Gastrointestinal:  Negative for nausea.   Genitourinary:  Negative for dysuria.   Musculoskeletal:  Negative for back pain.   Skin:  Negative for rash.   Neurological:  Positive for dizziness and headaches. Negative for weakness.   Hematological:  Does not bruise/bleed easily.   All other systems reviewed and are negative.    Physical Exam     Initial Vitals   BP Pulse Resp Temp SpO2   05/23/23 0929 05/23/23 0927 05/23/23 0927 05/23/23 0927 05/23/23 0927   (!) 238/136 73 18 97.5 °F (36.4 °C) 99 %      MAP       --                Physical Exam    Nursing note and vitals reviewed.  Constitutional: She appears well-developed and well-nourished.   HENT:   Head: Normocephalic and atraumatic.   Eyes: Pupils are equal, round, and reactive to light.   Neck:   Normal range of motion.  Cardiovascular:  Normal rate and regular rhythm.           Pulmonary/Chest: Breath sounds normal.   Abdominal: Abdomen is soft. Bowel sounds are normal.   Musculoskeletal:         General: Normal range of motion.      Cervical back: Normal range of motion.     Neurological: She is alert and oriented to person, place, and time.   Skin: Skin is warm and dry.   Psychiatric: She has a normal mood and affect.       ED Course   Procedures  Labs Reviewed - No data to display       Imaging Results    None          Medications   hydrALAZINE tablet 50 mg (50 mg Oral Given 5/23/23 0936)   acetaminophen tablet 1,000 mg  (1,000 mg Oral Given 5/23/23 0942)     Medical Decision Making:   Differential Diagnosis:   Uncontrolled hypertension                        Clinical Impression:   Final diagnoses:  [I10] Hypertension, unspecified type (Primary)        ED Disposition Condition    Discharge Stable          ED Prescriptions    None       Follow-up Information       Follow up With Specialties Details Why Contact Info    Karmen Bradford MD Internal Medicine  As needed [facility] [address]  [facility]  [facility] [address]  [phone]               Anne Sloan NP  05/23/23 1029       Anne Sloan NP  05/23/23 1035

## 2023-05-23 NOTE — DISCHARGE INSTRUCTIONS
Follow-up with PCP to possibly get medication for a.m. dose of blood pressure this not cause sedation

## 2023-10-06 DIAGNOSIS — Z12.31 ENCOUNTER FOR SCREENING MAMMOGRAM FOR BREAST CANCER: Primary | ICD-10-CM

## 2023-10-12 ENCOUNTER — HOSPITAL ENCOUNTER (OUTPATIENT)
Dept: RADIOLOGY | Facility: HOSPITAL | Age: 48
Discharge: HOME | End: 2023-10-12
Attending: INTERNAL MEDICINE
Payer: MEDICAID

## 2023-10-12 VITALS — WEIGHT: 232 LBS | HEIGHT: 65 IN | BODY MASS INDEX: 38.65 KG/M2

## 2023-10-12 DIAGNOSIS — Z12.31 ENCOUNTER FOR SCREENING MAMMOGRAM FOR BREAST CANCER: ICD-10-CM

## 2023-10-12 PROCEDURE — 77067 SCR MAMMO BI INCL CAD: CPT | Mod: TC

## 2023-12-03 ENCOUNTER — HOSPITAL ENCOUNTER (EMERGENCY)
Facility: HOSPITAL | Age: 48
Discharge: HOME | End: 2023-12-03
Attending: EMERGENCY MEDICINE
Payer: MEDICAID

## 2023-12-03 VITALS
DIASTOLIC BLOOD PRESSURE: 77 MMHG | HEIGHT: 65 IN | WEIGHT: 235 LBS | TEMPERATURE: 98 F | SYSTOLIC BLOOD PRESSURE: 191 MMHG | RESPIRATION RATE: 18 BRPM | OXYGEN SATURATION: 96 % | BODY MASS INDEX: 39.15 KG/M2 | HEART RATE: 84 BPM

## 2023-12-03 DIAGNOSIS — N30.91 HEMORRHAGIC CYSTITIS: Primary | ICD-10-CM

## 2023-12-03 DIAGNOSIS — I10 PRIMARY HYPERTENSION: ICD-10-CM

## 2023-12-03 DIAGNOSIS — K57.92 DIVERTICULITIS: ICD-10-CM

## 2023-12-03 LAB
BACTERIA #/AREA URNS HPF: NEGATIVE /HPF
BILIRUB UR QL STRIP: ABNORMAL
CLARITY UR: ABNORMAL
COLOR UR: ABNORMAL
GLUCOSE UR QL STRIP: NEGATIVE
HGB UR QL STRIP: NEGATIVE
HYALINE CASTS #/AREA URNS LPF: 3.6 /LPF
KETONES UR QL STRIP: ABNORMAL
LEUKOCYTE ESTERASE UR QL STRIP: ABNORMAL
MICROSCOPIC COMMENT: ABNORMAL
NITRITE UR QL STRIP: POSITIVE
PH UR STRIP: ABNORMAL [PH] (ref 5–8)
PROT UR QL STRIP: ABNORMAL
RBC #/AREA URNS HPF: 13 /HPF (ref 0–4)
SP GR UR STRIP: 1.02 (ref 1–1.03)
SQUAMOUS #/AREA URNS HPF: 10 /HPF
URN SPEC COLLECT METH UR: ABNORMAL
UROBILINOGEN UR STRIP-ACNC: 1 EU/DL
WBC #/AREA URNS HPF: 6 /HPF (ref 0–5)

## 2023-12-03 PROCEDURE — 99285 EMERGENCY DEPT VISIT HI MDM: CPT | Mod: 25

## 2023-12-03 PROCEDURE — 63600175 PHARM REV CODE 636 W HCPCS: Performed by: EMERGENCY MEDICINE

## 2023-12-03 PROCEDURE — 81000 URINALYSIS NONAUTO W/SCOPE: CPT | Performed by: EMERGENCY MEDICINE

## 2023-12-03 PROCEDURE — 25000003 PHARM REV CODE 250: Performed by: EMERGENCY MEDICINE

## 2023-12-03 PROCEDURE — 96372 THER/PROPH/DIAG INJ SC/IM: CPT | Performed by: EMERGENCY MEDICINE

## 2023-12-03 RX ORDER — CEFUROXIME AXETIL 250 MG/1
500 TABLET ORAL EVERY 12 HOURS
Status: COMPLETED | OUTPATIENT
Start: 2023-12-03 | End: 2023-12-03

## 2023-12-03 RX ORDER — ONDANSETRON 4 MG/1
4 TABLET, ORALLY DISINTEGRATING ORAL EVERY 8 HOURS PRN
Qty: 8 TABLET | Refills: 0 | Status: SHIPPED | OUTPATIENT
Start: 2023-12-03

## 2023-12-03 RX ORDER — PHENAZOPYRIDINE 200 MG/1
200 TABLET, FILM COATED ORAL 3 TIMES DAILY
COMMUNITY
Start: 2023-12-02

## 2023-12-03 RX ORDER — KETOROLAC TROMETHAMINE 30 MG/ML
15 INJECTION, SOLUTION INTRAMUSCULAR; INTRAVENOUS
Status: COMPLETED | OUTPATIENT
Start: 2023-12-03 | End: 2023-12-03

## 2023-12-03 RX ORDER — CEFUROXIME AXETIL 500 MG/1
500 TABLET ORAL EVERY 12 HOURS
Qty: 14 TABLET | Refills: 0 | Status: SHIPPED | OUTPATIENT
Start: 2023-12-03 | End: 2023-12-10

## 2023-12-03 RX ADMIN — KETOROLAC TROMETHAMINE 15 MG: 30 INJECTION, SOLUTION INTRAMUSCULAR; INTRAVENOUS at 03:12

## 2023-12-03 RX ADMIN — CEFUROXIME AXETIL 500 MG: 250 TABLET, FILM COATED ORAL at 03:12

## 2023-12-03 NOTE — ED PROVIDER NOTES
EMERGENCY DEPARTMENT HISTORY AND PHYSICAL EXAM     This note is dictated on M*Modal word recognition program.  There are word recognition mistakes and grammatical errors that are occasionally missed on review.     Date: 12/3/2023   Patient Name: Mallorie Diego       History of Presenting Illness      Chief Complaint   Patient presents with    Flank Pain     Patient presents to ED c/o bilateral flank pain radiating to abdomen. Patient seen at urgent care yesterday and diagnosed with acute cystitis with hematuria. Patient compliant with meds but c/o increasing pain        0215   Mallorie Diego is a 48 y.o. female with PMHX of seizures, migraine, CVA who presents to the emergency department C/O suprapubic pain.    Patient reports suprapubic pain that radiates both flanks.  Symptoms started yesterday morning was seen at urgent care diagnosed with UTI and started on Macrobid and Pyridium.  Patient denies fever, denies vomiting.  Has taken 2 doses of Macrobid and here because symptoms are continuing.       PCP: Karmen Bradford MD        No current facility-administered medications for this encounter.     Current Outpatient Medications   Medication Sig Dispense Refill    PYRIDIUM 200 mg tablet Take 200 mg by mouth 3 (three) times daily.      albuterol (PROVENTIL/VENTOLIN HFA) 90 mcg/actuation inhaler Inhale 2 puffs into the lungs every 6 (six) hours as needed for Wheezing. 18 g 0    apixaban (ELIQUIS) 5 mg Tab Take 1 tablet (5 mg total) by mouth 2 (two) times daily. 60 tablet 0    aspirin (ECOTRIN) 81 MG EC tablet Take 1 tablet (81 mg total) by mouth once daily.  0    butalbital-acetaminophen-caffeine -40 mg (FIORICET, ESGIC) -40 mg per tablet Take 1 tablet by mouth every 4 to 6 hours as needed for Headaches.      cefUROXime (CEFTIN) 500 MG tablet Take 1 tablet (500 mg total) by mouth every 12 (twelve) hours. for 7 days 14 tablet 0    clonazePAM (KLONOPIN) 2 MG Tab One table one to two times daily ONLY as  needed for severe anxiety. 60 tablet 0    cloNIDine (CATAPRES) 0.1 MG tablet Take 0.1 mg by mouth 2 (two) times daily.      cloNIDine (CATAPRES) 0.2 MG tablet Take 0.1 mg by mouth 2 (two) times daily.      diazePAM (VALIUM) 5 MG tablet Take 2 tablets (10 mg total) by mouth every 8 (eight) hours as needed for Anxiety. 6 tablet 0    labetaloL (NORMODYNE) 100 MG tablet Take 200 mg by mouth 2 (two) times daily.      ondansetron (ZOFRAN) 4 MG tablet Take 1 tablet (4 mg total) by mouth every 6 (six) hours. 12 tablet 0    ondansetron (ZOFRAN-ODT) 4 MG TbDL Take 1 tablet (4 mg total) by mouth every 8 (eight) hours as needed (Nasuea). 8 tablet 0    rosuvastatin (CRESTOR) 20 MG tablet Take 1 tablet (20 mg total) by mouth once daily. 30 tablet 0           Past History     Past Medical History:   Past Medical History:   Diagnosis Date    Anxiety     Depression     DVT (deep venous thrombosis)     Headache(784.0)     Hepatitis     Hep C ab    History of blood clots     Hypertension     Insomnia     Pulmonary embolism     Seizures     Smoker     Stroke         Past Surgical History:   Past Surgical History:   Procedure Laterality Date    BREAST BIOPSY Left     benign    BREAST MASS EXCISION       SECTION      CHOLECYSTECTOMY      FEMORAL ARTERY STENT      HYSTERECTOMY      LYMPHADENECTOMY      OOPHORECTOMY      UPPER GASTROINTESTINAL ENDOSCOPY          Family History:   Family History   Problem Relation Age of Onset    Stroke Mother     Heart disease Mother     Heart attack Mother     Ovarian cancer Mother     Cancer Father     Lung cancer Father     Brain cancer Father     Stroke Sister     Breast cancer Sister     No Known Problems Daughter     No Known Problems Maternal Aunt     No Known Problems Maternal Uncle     No Known Problems Paternal Aunt     No Known Problems Paternal Uncle     No Known Problems Maternal Grandmother     No Known Problems Maternal Grandfather     No Known Problems Paternal Grandmother     No  "Known Problems Paternal Grandfather     BRCA 1/2 Neg Hx         Social History:   Social History     Tobacco Use    Smoking status: Every Day     Current packs/day: 0.50     Types: Cigarettes    Smokeless tobacco: Never   Substance Use Topics    Alcohol use: No     Alcohol/week: 0.0 standard drinks of alcohol    Drug use: No        Allergies:   Review of patient's allergies indicates:   Allergen Reactions    Tegretol [carbamazepine] Swelling    Lamictal [lamotrigine] Rash          Review of Systems   Review of Systems   See HPI for pertinent positives and negatives       Physical Exam     Vitals:    12/03/23 0146 12/03/23 0321   BP: (!) 178/114 (!) 191/77   Pulse: 84 84   Resp: 18    Temp: 97.6 °F (36.4 °C)    SpO2: 96%    Weight: 106.6 kg (235 lb)    Height: 5' 5" (1.651 m)       Physical Exam  Vitals and nursing note reviewed.   Constitutional:       General: She is not in acute distress.     Appearance: Normal appearance. She is not ill-appearing.   HENT:      Head: Normocephalic and atraumatic.      Right Ear: External ear normal.      Left Ear: External ear normal.      Nose: Nose normal. No congestion or rhinorrhea.      Mouth/Throat:      Mouth: Mucous membranes are moist.   Eyes:      Conjunctiva/sclera: Conjunctivae normal.      Pupils: Pupils are equal, round, and reactive to light.   Pulmonary:      Effort: Pulmonary effort is normal. No respiratory distress.   Abdominal:      General: There is no distension.      Palpations: Abdomen is soft.      Tenderness: There is no abdominal tenderness. There is no right CVA tenderness, left CVA tenderness, guarding or rebound.   Musculoskeletal:         General: No deformity. Normal range of motion.      Cervical back: Normal range of motion. No rigidity.   Skin:     General: Skin is dry.   Neurological:      General: No focal deficit present.      Mental Status: She is alert and oriented to person, place, and time. Mental status is at baseline.   Psychiatric:    "      Mood and Affect: Mood normal.         Behavior: Behavior normal.              Diagnostic Study Results      Labs -   Recent Results (from the past 12 hour(s))   Urinalysis, Reflex to Urine Culture Urine, Clean Catch    Collection Time: 12/03/23  1:48 AM    Specimen: Urine, Clean Catch   Result Value Ref Range    Specimen UA Urine, Clean Catch     Color, UA Red (A) Yellow, Straw, Sumi    Appearance, UA Cloudy (A) Clear    pH, UA ERROR 5.0 - 8.0    Specific Gravity, UA 1.020 1.005 - 1.030    Protein, UA 1+ (A) Negative    Glucose, UA Negative Negative    Ketones, UA ERROR Negative    Bilirubin (UA) 2+ (A) Negative    Occult Blood UA Negative Negative    Nitrite, UA Positive (A) Negative    Urobilinogen, UA 1.0 <2.0 EU/dL    Leukocytes, UA 2+ (A) Negative   Urinalysis Microscopic    Collection Time: 12/03/23  1:48 AM   Result Value Ref Range    RBC, UA 13 (H) 0 - 4 /hpf    WBC, UA 6 (H) 0 - 5 /hpf    Bacteria Negative None-Occ /hpf    Squam Epithel, UA 10 /hpf    Hyaline Casts, UA 3.6 (A) 0-1/lpf /lpf    Microscopic Comment SEE COMMENT         Radiologic Studies -    CT Abdomen Pelvis  Without Contrast    (Results Pending)        Medications given in the ED-   Medications   cefUROXime tablet 500 mg (500 mg Oral Given 12/3/23 0327)   ketorolac injection 15 mg (15 mg Intramuscular Given 12/3/23 0322)           Medical Decision Making    I am the first provider for this patient.     I reviewed the vital signs, available nursing notes, past medical history, past surgical history, family history and social history.     Vital Signs:  Reviewed the patient's vital signs.     Pulse Oximetry Analysis and Interpretation:    96% on Room Air, normal        External Test Results (Pertinent to encounter):    Records Reviewed: Nursing Notes, Current Prescription Medications, Old Medical Records, and External Medical Records     History Obtained By: Patient    Provider Notes: Mallorie Diego is a 48 y.o. female with  cystitis    Co-morbidities Considered:     Differential Diagnosis:  Cystitis, pyelonephritis, obstructive uropathy      ED Course:    2:33 AM  UA consistent with hemorrhagic cystitis although noting patient has been taking Pyridium which may interfere with results.  Given pelvic pain that is ascending upwards will switch antibiotic to a cephalosporin as Macrobid not appropriate treatment for possible pyelonephritis.  Patient well-appearing, tolerating p.o., no acute distress.  Able to sit up from bed without any pain or discomfort and ambulate to bathroom.  Reviewed CT which is not demonstrate any evidence of urinary obstruction per my read.     3:45 AM  CT demonstrates uncomplicated diverticulitis, rx'd cefpodoxime.          Problems Addressed:  Cystitis, diverticulitis    Procedures:   Procedures       Diagnosis and Disposition     Critical Care:      DISCHARGE NOTE:       Mallorie Diego's  results have been reviewed with her.  She has been counseled regarding her diagnosis, treatment, and plan.  She verbally conveys understanding and agreement of the signs, symptoms, diagnosis, treatment and prognosis and additionally agrees to follow up as discussed.  She also agrees with the care-plan and conveys that all of her questions have been answered.  I have also provided discharge instructions for her that include: educational information regarding their diagnosis and treatment, and list of reasons why they would want to return to the ED prior to their follow-up appointment, should her condition change. She has been provided with education for proper emergency department utilization.         CLINICAL IMPRESSION:       1. Hemorrhagic cystitis    2. Diverticulitis    3. Primary hypertension              PLAN:   1. Discharge Home  2.      Medication List        START taking these medications      cefUROXime 500 MG tablet  Commonly known as: CEFTIN  Take 1 tablet (500 mg total) by mouth every 12 (twelve) hours. for 7 days      ondansetron 4 MG Tbdl  Commonly known as: ZOFRAN-ODT  Take 1 tablet (4 mg total) by mouth every 8 (eight) hours as needed (Nasuea).            ASK your doctor about these medications      albuterol 90 mcg/actuation inhaler  Commonly known as: PROVENTIL/VENTOLIN HFA  Inhale 2 puffs into the lungs every 6 (six) hours as needed for Wheezing.     apixaban 5 mg Tab  Commonly known as: ELIQUIS  Take 1 tablet (5 mg total) by mouth 2 (two) times daily.     aspirin 81 MG EC tablet  Commonly known as: ECOTRIN  Take 1 tablet (81 mg total) by mouth once daily.     butalbital-acetaminophen-caffeine -40 mg -40 mg per tablet  Commonly known as: FIORICET, ESGIC     clonazePAM 2 MG Tab  Commonly known as: KlonoPIN  One table one to two times daily ONLY as needed for severe anxiety.     * cloNIDine 0.2 MG tablet  Commonly known as: CATAPRES     * cloNIDine 0.1 MG tablet  Commonly known as: CATAPRES     diazePAM 5 MG tablet  Commonly known as: VALIUM  Take 2 tablets (10 mg total) by mouth every 8 (eight) hours as needed for Anxiety.     labetaloL 100 MG tablet  Commonly known as: NORMODYNE     ondansetron 4 MG tablet  Commonly known as: ZOFRAN  Take 1 tablet (4 mg total) by mouth every 6 (six) hours.     PYRIDIUM 200 MG tablet  Generic drug: phenazopyridine     rosuvastatin 20 MG tablet  Commonly known as: CRESTOR  Take 1 tablet (20 mg total) by mouth once daily.           * This list has 2 medication(s) that are the same as other medications prescribed for you. Read the directions carefully, and ask your doctor or other care provider to review them with you.                   Where to Get Your Medications        [id number] - [place], [place] - [address] [place], [place] [address]R [address] AT [facility] & [address]  [address] [place] [id number]      Phone: [phone]   cefUROXime 500 MG tablet  ondansetron 4 MG Tbdl        3. Karmen Bradford MD  [address] [place] [address]  [address]  [facility]  [facility] [address]  [phone]    Schedule an appointment as soon as possible for a visit   Primary care follow up    [facility] Emergency Department  [facility] [id number] [phone]  Go to   If symptoms worsen       _______________________________     Please note that this dictation was completed with [employer], the computer voice recognition software.  Quite often unanticipated grammatical, syntax, homophones, and other interpretive errors are inadvertently transcribed by the computer software.  Please disregard these errors.  Please excuse any errors that have escaped final proofreading.             Petar Su MD  12/03/23 0346

## 2023-12-03 NOTE — ED NOTES
"Patient states that she did not take blood pressure medication today because "she was sleeping all day".   "

## 2023-12-04 ENCOUNTER — HOSPITAL ENCOUNTER (EMERGENCY)
Facility: HOSPITAL | Age: 48
Discharge: HOME | End: 2023-12-04
Attending: EMERGENCY MEDICINE
Payer: MEDICAID

## 2023-12-04 VITALS
TEMPERATURE: 97 F | BODY MASS INDEX: 39.15 KG/M2 | SYSTOLIC BLOOD PRESSURE: 198 MMHG | DIASTOLIC BLOOD PRESSURE: 83 MMHG | HEART RATE: 77 BPM | RESPIRATION RATE: 18 BRPM | HEIGHT: 65 IN | OXYGEN SATURATION: 96 % | WEIGHT: 235 LBS

## 2023-12-04 DIAGNOSIS — N39.0 URINARY TRACT INFECTION WITH HEMATURIA, SITE UNSPECIFIED: Primary | ICD-10-CM

## 2023-12-04 DIAGNOSIS — R31.9 URINARY TRACT INFECTION WITH HEMATURIA, SITE UNSPECIFIED: Primary | ICD-10-CM

## 2023-12-04 DIAGNOSIS — R10.32 LEFT LOWER QUADRANT ABDOMINAL PAIN: ICD-10-CM

## 2023-12-04 DIAGNOSIS — K57.92 DIVERTICULITIS: ICD-10-CM

## 2023-12-04 LAB
ALBUMIN SERPL BCP-MCNC: 3.5 G/DL (ref 3.5–5.2)
ALP SERPL-CCNC: 116 U/L (ref 55–135)
ALT SERPL W/O P-5'-P-CCNC: 95 U/L (ref 10–44)
ANION GAP SERPL CALC-SCNC: 5 MMOL/L (ref 3–11)
AST SERPL-CCNC: 47 U/L (ref 10–40)
BASOPHILS # BLD AUTO: 0.05 K/UL (ref 0–0.2)
BASOPHILS NFR BLD: 0.6 % (ref 0–1.9)
BILIRUB SERPL-MCNC: 0.5 MG/DL (ref 0.1–1)
BUN SERPL-MCNC: 10 MG/DL (ref 6–20)
CALCIUM SERPL-MCNC: 9.1 MG/DL (ref 8.7–10.5)
CHLORIDE SERPL-SCNC: 105 MMOL/L (ref 95–110)
CO2 SERPL-SCNC: 30 MMOL/L (ref 23–29)
CREAT SERPL-MCNC: 1 MG/DL (ref 0.5–1.4)
DIFFERENTIAL METHOD: ABNORMAL
EOSINOPHIL # BLD AUTO: 0.2 K/UL (ref 0–0.5)
EOSINOPHIL NFR BLD: 2 % (ref 0–8)
ERYTHROCYTE [DISTWIDTH] IN BLOOD BY AUTOMATED COUNT: 11.9 % (ref 11.5–14.5)
EST. GFR  (NO RACE VARIABLE): >60 ML/MIN/1.73 M^2
GLUCOSE SERPL-MCNC: 134 MG/DL (ref 70–110)
HCT VFR BLD AUTO: 49.3 % (ref 37–48.5)
HGB BLD-MCNC: 16.4 G/DL (ref 12–16)
IMM GRANULOCYTES # BLD AUTO: 0.03 K/UL (ref 0–0.04)
IMM GRANULOCYTES NFR BLD AUTO: 0.4 % (ref 0–0.5)
LYMPHOCYTES # BLD AUTO: 1.7 K/UL (ref 1–4.8)
LYMPHOCYTES NFR BLD: 21.5 % (ref 18–48)
MCH RBC QN AUTO: 31.9 PG (ref 27–31)
MCHC RBC AUTO-ENTMCNC: 33.3 G/DL (ref 32–36)
MCV RBC AUTO: 96 FL (ref 82–98)
MONOCYTES # BLD AUTO: 0.6 K/UL (ref 0.3–1)
MONOCYTES NFR BLD: 6.9 % (ref 4–15)
NEUTROPHILS # BLD AUTO: 5.4 K/UL (ref 1.8–7.7)
NEUTROPHILS NFR BLD: 68.6 % (ref 38–73)
NRBC BLD-RTO: 0 /100 WBC
PLATELET # BLD AUTO: 220 K/UL (ref 150–450)
PMV BLD AUTO: 11 FL (ref 9.2–12.9)
POTASSIUM SERPL-SCNC: 4.1 MMOL/L (ref 3.5–5.1)
PROT SERPL-MCNC: 7.4 G/DL (ref 6–8.4)
RBC # BLD AUTO: 5.14 M/UL (ref 4–5.4)
SODIUM SERPL-SCNC: 140 MMOL/L (ref 136–145)
WBC # BLD AUTO: 7.92 K/UL (ref 3.9–12.7)

## 2023-12-04 PROCEDURE — 96372 THER/PROPH/DIAG INJ SC/IM: CPT | Performed by: NURSE PRACTITIONER

## 2023-12-04 PROCEDURE — 63600175 PHARM REV CODE 636 W HCPCS: Performed by: NURSE PRACTITIONER

## 2023-12-04 PROCEDURE — 85025 COMPLETE CBC W/AUTO DIFF WBC: CPT | Performed by: NURSE PRACTITIONER

## 2023-12-04 PROCEDURE — 99284 EMERGENCY DEPT VISIT MOD MDM: CPT

## 2023-12-04 PROCEDURE — 36415 COLL VENOUS BLD VENIPUNCTURE: CPT | Performed by: NURSE PRACTITIONER

## 2023-12-04 PROCEDURE — 80053 COMPREHEN METABOLIC PANEL: CPT | Performed by: NURSE PRACTITIONER

## 2023-12-04 RX ORDER — KETOROLAC TROMETHAMINE 30 MG/ML
30 INJECTION, SOLUTION INTRAMUSCULAR; INTRAVENOUS
Status: COMPLETED | OUTPATIENT
Start: 2023-12-04 | End: 2023-12-04

## 2023-12-04 RX ORDER — HYDROMORPHONE HYDROCHLORIDE 1 MG/ML
1 INJECTION, SOLUTION INTRAMUSCULAR; INTRAVENOUS; SUBCUTANEOUS
Status: DISCONTINUED | OUTPATIENT
Start: 2023-12-04 | End: 2023-12-04

## 2023-12-04 RX ORDER — PROMETHAZINE HYDROCHLORIDE 25 MG/ML
25 INJECTION, SOLUTION INTRAMUSCULAR; INTRAVENOUS
Status: DISCONTINUED | OUTPATIENT
Start: 2023-12-04 | End: 2023-12-04

## 2023-12-04 RX ADMIN — KETOROLAC TROMETHAMINE 30 MG: 30 INJECTION, SOLUTION INTRAMUSCULAR; INTRAVENOUS at 12:12

## 2023-12-04 NOTE — DISCHARGE INSTRUCTIONS
Continue medications as directed and be sure to complete antibiotics. Follow a clear liquid diet until symptoms improved then advance your diet as tolerated. Return to the ED for worsening symptoms. See your doctor tomorrow as scheduled.

## 2024-07-24 ENCOUNTER — APPOINTMENT (OUTPATIENT)
Dept: LAB | Facility: HOSPITAL | Age: 49
End: 2024-07-24
Attending: INTERNAL MEDICINE
Payer: MEDICAID

## 2024-07-24 DIAGNOSIS — H93.299: Primary | ICD-10-CM

## 2024-07-24 LAB
BACTERIA #/AREA URNS HPF: NORMAL /HPF
BILIRUB UR QL STRIP: ABNORMAL
CLARITY UR: ABNORMAL
COLOR UR: YELLOW
GLUCOSE UR QL STRIP: NEGATIVE
HGB UR QL STRIP: NEGATIVE
HYALINE CASTS #/AREA URNS LPF: 0 /LPF
KETONES UR QL STRIP: ABNORMAL
LEUKOCYTE ESTERASE UR QL STRIP: ABNORMAL
MICROSCOPIC COMMENT: NORMAL
NITRITE UR QL STRIP: NEGATIVE
PH UR STRIP: 6 [PH] (ref 5–8)
PROT UR QL STRIP: ABNORMAL
RBC #/AREA URNS HPF: 1 /HPF (ref 0–4)
SP GR UR STRIP: 1.02 (ref 1–1.03)
SQUAMOUS #/AREA URNS HPF: 2 /HPF
URN SPEC COLLECT METH UR: ABNORMAL
UROBILINOGEN UR STRIP-ACNC: 1 EU/DL
WBC #/AREA URNS HPF: 1 /HPF (ref 0–5)

## 2024-07-24 PROCEDURE — 81000 URINALYSIS NONAUTO W/SCOPE: CPT | Performed by: INTERNAL MEDICINE

## 2024-10-04 ENCOUNTER — HOSPITAL ENCOUNTER (EMERGENCY)
Facility: HOSPITAL | Age: 49
Discharge: HOME | End: 2024-10-04
Attending: STUDENT IN AN ORGANIZED HEALTH CARE EDUCATION/TRAINING PROGRAM
Payer: MEDICAID

## 2024-10-04 VITALS
WEIGHT: 237.63 LBS | OXYGEN SATURATION: 95 % | HEART RATE: 70 BPM | HEIGHT: 64 IN | RESPIRATION RATE: 16 BRPM | TEMPERATURE: 98 F | DIASTOLIC BLOOD PRESSURE: 76 MMHG | SYSTOLIC BLOOD PRESSURE: 148 MMHG | BODY MASS INDEX: 40.57 KG/M2

## 2024-10-04 DIAGNOSIS — R06.02 SHORTNESS OF BREATH: ICD-10-CM

## 2024-10-04 DIAGNOSIS — J02.9 PHARYNGITIS, UNSPECIFIED ETIOLOGY: ICD-10-CM

## 2024-10-04 DIAGNOSIS — H66.90 OTITIS MEDIA, UNSPECIFIED LATERALITY, UNSPECIFIED OTITIS MEDIA TYPE: Primary | ICD-10-CM

## 2024-10-04 LAB
BASOPHILS # BLD AUTO: 0.05 K/UL (ref 0–0.2)
BASOPHILS NFR BLD: 0.5 % (ref 0–1.9)
CTP QC/QA: YES
CTP QC/QA: YES
DIFFERENTIAL METHOD BLD: ABNORMAL
EOSINOPHIL # BLD AUTO: 0.2 K/UL (ref 0–0.5)
EOSINOPHIL NFR BLD: 2.6 % (ref 0–8)
ERYTHROCYTE [DISTWIDTH] IN BLOOD BY AUTOMATED COUNT: 11.9 % (ref 11.5–14.5)
GROUP A STREP, MOLECULAR: NEGATIVE
HCT VFR BLD AUTO: 46 % (ref 37–48.5)
HGB BLD-MCNC: 15.5 G/DL (ref 12–16)
IMM GRANULOCYTES # BLD AUTO: 0.04 K/UL (ref 0–0.04)
IMM GRANULOCYTES NFR BLD AUTO: 0.4 % (ref 0–0.5)
LYMPHOCYTES # BLD AUTO: 1.8 K/UL (ref 1–4.8)
LYMPHOCYTES NFR BLD: 19.2 % (ref 18–48)
MCH RBC QN AUTO: 31.9 PG (ref 27–31)
MCHC RBC AUTO-ENTMCNC: 33.7 G/DL (ref 32–36)
MCV RBC AUTO: 95 FL (ref 82–98)
MONOCYTES # BLD AUTO: 0.7 K/UL (ref 0.3–1)
MONOCYTES NFR BLD: 7.9 % (ref 4–15)
NEUTROPHILS # BLD AUTO: 6.5 K/UL (ref 1.8–7.7)
NEUTROPHILS NFR BLD: 69.4 % (ref 38–73)
NRBC BLD-RTO: 0 /100 WBC
PLATELET # BLD AUTO: 218 K/UL (ref 150–450)
PMV BLD AUTO: 10.7 FL (ref 9.2–12.9)
POC MOLECULAR INFLUENZA A AGN: NEGATIVE
POC MOLECULAR INFLUENZA B AGN: NEGATIVE
RBC # BLD AUTO: 4.86 M/UL (ref 4–5.4)
SARS-COV-2 RDRP RESP QL NAA+PROBE: NEGATIVE
TROPONIN I SERPL DL<=0.01 NG/ML-MCNC: 42.3 PG/ML (ref 0–60)
WBC # BLD AUTO: 9.33 K/UL (ref 3.9–12.7)

## 2024-10-04 PROCEDURE — 99284 EMERGENCY DEPT VISIT MOD MDM: CPT | Mod: 25

## 2024-10-04 PROCEDURE — 87651 STREP A DNA AMP PROBE: CPT | Performed by: STUDENT IN AN ORGANIZED HEALTH CARE EDUCATION/TRAINING PROGRAM

## 2024-10-04 PROCEDURE — 36415 COLL VENOUS BLD VENIPUNCTURE: CPT | Performed by: STUDENT IN AN ORGANIZED HEALTH CARE EDUCATION/TRAINING PROGRAM

## 2024-10-04 PROCEDURE — 85025 COMPLETE CBC W/AUTO DIFF WBC: CPT | Performed by: STUDENT IN AN ORGANIZED HEALTH CARE EDUCATION/TRAINING PROGRAM

## 2024-10-04 PROCEDURE — 63600175 PHARM REV CODE 636 W HCPCS: Performed by: STUDENT IN AN ORGANIZED HEALTH CARE EDUCATION/TRAINING PROGRAM

## 2024-10-04 PROCEDURE — 96374 THER/PROPH/DIAG INJ IV PUSH: CPT

## 2024-10-04 PROCEDURE — 84484 ASSAY OF TROPONIN QUANT: CPT | Performed by: STUDENT IN AN ORGANIZED HEALTH CARE EDUCATION/TRAINING PROGRAM

## 2024-10-04 PROCEDURE — 87635 SARS-COV-2 COVID-19 AMP PRB: CPT | Performed by: STUDENT IN AN ORGANIZED HEALTH CARE EDUCATION/TRAINING PROGRAM

## 2024-10-04 PROCEDURE — 87502 INFLUENZA DNA AMP PROBE: CPT

## 2024-10-04 RX ORDER — BENZONATATE 100 MG/1
100 CAPSULE ORAL 3 TIMES DAILY PRN
Qty: 30 CAPSULE | Refills: 0 | Status: SHIPPED | OUTPATIENT
Start: 2024-10-04 | End: 2024-10-14

## 2024-10-04 RX ORDER — AMOXICILLIN 500 MG/1
500 CAPSULE ORAL 3 TIMES DAILY
Qty: 21 CAPSULE | Refills: 0 | Status: SHIPPED | OUTPATIENT
Start: 2024-10-04 | End: 2024-10-11

## 2024-10-04 RX ORDER — HYDRALAZINE HYDROCHLORIDE 20 MG/ML
10 INJECTION INTRAMUSCULAR; INTRAVENOUS
Status: COMPLETED | OUTPATIENT
Start: 2024-10-04 | End: 2024-10-04

## 2024-10-04 RX ORDER — NAPROXEN 500 MG/1
500 TABLET ORAL 2 TIMES DAILY
Qty: 30 TABLET | Refills: 0 | Status: SHIPPED | OUTPATIENT
Start: 2024-10-04

## 2024-10-04 RX ADMIN — HYDRALAZINE HYDROCHLORIDE 10 MG: 20 INJECTION, SOLUTION INTRAMUSCULAR; INTRAVENOUS at 01:10

## 2024-10-04 NOTE — ED PROVIDER NOTES
Encounter Date: 10/4/2024       History     Chief Complaint   Patient presents with    Generalized Body Aches     Pt presents to ER for sore throat, both ears hurt and chest congestion. Took a covid test at home and it was negative. Symptoms started yesterday.      49-year-old female history hypertension presents to ED for complaints of cough, ear pain, body aches.  Reports symptoms have been ongoing for the past day.  Also reports that she has not been taking her blood pressure medications.  Denies any nausea or vomiting.  Denies any chest pain shortness breath.  Took NyQuil at home with minimal relief.        Review of patient's allergies indicates:   Allergen Reactions    Tegretol [carbamazepine] Swelling    Lamictal [lamotrigine] Rash     Past Medical History:   Diagnosis Date    Anxiety     Depression     DVT (deep venous thrombosis)     Headache(784.0)     Hepatitis     Hep C ab    History of blood clots     Hypertension     Insomnia     Pulmonary embolism     Seizures     Smoker     Stroke      Past Surgical History:   Procedure Laterality Date    BREAST BIOPSY Left     benign    BREAST MASS EXCISION       SECTION      CHOLECYSTECTOMY      FEMORAL ARTERY STENT      HYSTERECTOMY      LYMPHADENECTOMY      OOPHORECTOMY      UPPER GASTROINTESTINAL ENDOSCOPY       Family History   Problem Relation Name Age of Onset    Stroke Mother      Heart disease Mother      Heart attack Mother      Ovarian cancer Mother      Cancer Father      Lung cancer Father      Brain cancer Father      Stroke Sister      Breast cancer Sister      No Known Problems Daughter      No Known Problems Maternal Aunt      No Known Problems Maternal Uncle      No Known Problems Paternal Aunt      No Known Problems Paternal Uncle      No Known Problems Maternal Grandmother      No Known Problems Maternal Grandfather      No Known Problems Paternal Grandmother      No Known Problems Paternal Grandfather      BRCA 1/2 Neg Hx       Social  History     Tobacco Use    Smoking status: Every Day     Current packs/day: 0.50     Types: Cigarettes    Smokeless tobacco: Never   Substance Use Topics    Alcohol use: No     Alcohol/week: 0.0 standard drinks of alcohol    Drug use: No     Review of Systems   Respiratory:  Negative for chest tightness.        Physical Exam     Initial Vitals   BP Pulse Resp Temp SpO2   10/04/24 0024 10/04/24 0023 10/04/24 0023 10/04/24 0023 10/04/24 0023   (!) 226/116 67 18 97.8 °F (36.6 °C) 98 %      MAP       --                Physical Exam    Vitals reviewed.  Constitutional: She appears well-developed and well-nourished.   HENT:   Right otitis media, no mastoid tenderness.  Tympanic membrane is bulging.   Eyes: Pupils are equal, round, and reactive to light.   Neck:   Normal range of motion.  Cardiovascular:  Normal rate.           Pulmonary/Chest: Breath sounds normal.   Abdominal: Abdomen is soft.   Musculoskeletal:      Cervical back: Normal range of motion.     Neurological: She is alert. GCS score is 15. GCS eye subscore is 4. GCS verbal subscore is 5. GCS motor subscore is 6.   Skin: Skin is warm.         ED Course   Procedures  Labs Reviewed   CBC W/ AUTO DIFFERENTIAL - Abnormal       Result Value    WBC 9.33      RBC 4.86      Hemoglobin 15.5      Hematocrit 46.0      MCV 95      MCH 31.9 (*)     MCHC 33.7      RDW 11.9      Platelets 218      MPV 10.7      Immature Granulocytes 0.4      Gran # (ANC) 6.5      Immature Grans (Abs) 0.04      Lymph # 1.8      Mono # 0.7      Eos # 0.2      Baso # 0.05      nRBC 0      Gran % 69.4      Lymph % 19.2      Mono % 7.9      Eosinophil % 2.6      Basophil % 0.5      Differential Method Automated     GROUP A STREP, MOLECULAR   TROPONIN I HIGH SENSITIVITY    Troponin I High Sensitivity 42.3     COMPREHENSIVE METABOLIC PANEL   SARS-COV-2 RDRP GENE    POC Rapid COVID Negative       Acceptable Yes     POCT INFLUENZA A/B MOLECULAR    POC Molecular Influenza A Ag  Negative      POC Molecular Influenza B Ag Negative       Acceptable Yes            Imaging Results    None          Medications   hydrALAZINE injection 10 mg (10 mg Intravenous Given 10/4/24 0102)     Medical Decision Making  Patient has a hypertensive here in the emergency department.  Has not taken her home blood pressure medication otherwise in no acute distress.  Has a otitis media of the right ear along with a mild pharyngeal erythema.  Plan for labs, swabs, hydralazine, and reassess.        Labs reveal no evidence of leukocytosis or electrolyte abnormalities.  Blood pressure improved with IV hydralazine.  Patient felt better.  Will be discharged home with antibiotics.  Return precautions given.  Patient understands and agrees with plan.    Amount and/or Complexity of Data Reviewed  Labs: ordered.  Radiology: ordered.    Risk  Prescription drug management.                                      Clinical Impression:  Final diagnoses:  [R06.02] Shortness of breath                 Avery Camacho MD  10/04/24 2127

## 2024-10-31 DIAGNOSIS — Z12.31 ENCOUNTER FOR SCREENING MAMMOGRAM FOR MALIGNANT NEOPLASM OF BREAST: Primary | ICD-10-CM

## 2024-11-13 ENCOUNTER — HOSPITAL ENCOUNTER (INPATIENT)
Facility: HOSPITAL | Age: 49
LOS: 1 days | Discharge: HOME | DRG: 064 | End: 2024-11-14
Attending: EMERGENCY MEDICINE | Admitting: STUDENT IN AN ORGANIZED HEALTH CARE EDUCATION/TRAINING PROGRAM
Payer: MEDICAID

## 2024-11-13 DIAGNOSIS — R29.818 ACUTE FOCAL NEUROLOGICAL DEFICIT: ICD-10-CM

## 2024-11-13 DIAGNOSIS — I63.9 CEREBROVASCULAR ACCIDENT (CVA), UNSPECIFIED MECHANISM: Primary | ICD-10-CM

## 2024-11-13 PROBLEM — R47.81 SLURRED SPEECH: Status: ACTIVE | Noted: 2024-11-13

## 2024-11-13 PROBLEM — F11.90 CHRONIC, CONTINUOUS USE OF OPIOIDS: Status: ACTIVE | Noted: 2024-11-13

## 2024-11-13 PROBLEM — E87.6 HYPOKALEMIA: Status: ACTIVE | Noted: 2024-11-13

## 2024-11-13 LAB
ALBUMIN SERPL BCP-MCNC: 3.9 G/DL (ref 3.5–5.2)
ALP SERPL-CCNC: 88 U/L (ref 55–135)
ALT SERPL W/O P-5'-P-CCNC: 37 U/L (ref 10–44)
ANION GAP SERPL CALC-SCNC: 8 MMOL/L (ref 3–11)
APTT PPP: 25.3 SEC (ref 21–32)
AST SERPL-CCNC: 23 U/L (ref 10–40)
BASOPHILS # BLD AUTO: 0.05 K/UL (ref 0–0.2)
BASOPHILS NFR BLD: 0.7 % (ref 0–1.9)
BILIRUB SERPL-MCNC: 0.5 MG/DL (ref 0.1–1)
BUN SERPL-MCNC: 17 MG/DL (ref 6–20)
CALCIUM SERPL-MCNC: 9 MG/DL (ref 8.7–10.5)
CHLORIDE SERPL-SCNC: 101 MMOL/L (ref 95–110)
CHOLEST SERPL-MCNC: 159 MG/DL (ref 120–199)
CHOLEST/HDLC SERPL: 4.2 {RATIO} (ref 2–5)
CO2 SERPL-SCNC: 29 MMOL/L (ref 23–29)
CREAT SERPL-MCNC: 1.2 MG/DL (ref 0.5–1.4)
DIFFERENTIAL METHOD BLD: ABNORMAL
EOSINOPHIL # BLD AUTO: 0.2 K/UL (ref 0–0.5)
EOSINOPHIL NFR BLD: 2.1 % (ref 0–8)
ERYTHROCYTE [DISTWIDTH] IN BLOOD BY AUTOMATED COUNT: 12 % (ref 11.5–14.5)
EST. GFR  (NO RACE VARIABLE): 54.8 ML/MIN/1.73 M^2
GLUCOSE SERPL-MCNC: 106 MG/DL (ref 70–110)
HCT VFR BLD AUTO: 44.9 % (ref 37–48.5)
HDLC SERPL-MCNC: 38 MG/DL (ref 40–75)
HDLC SERPL: 23.9 % (ref 20–50)
HGB BLD-MCNC: 15.5 G/DL (ref 12–16)
IMM GRANULOCYTES # BLD AUTO: 0.01 K/UL (ref 0–0.04)
IMM GRANULOCYTES NFR BLD AUTO: 0.1 % (ref 0–0.5)
INR PPP: 0.9 (ref 0.8–1.2)
LDLC SERPL CALC-MCNC: 91.6 MG/DL (ref 63–159)
LYMPHOCYTES # BLD AUTO: 1.8 K/UL (ref 1–4.8)
LYMPHOCYTES NFR BLD: 23.2 % (ref 18–48)
MCH RBC QN AUTO: 31.9 PG (ref 27–31)
MCHC RBC AUTO-ENTMCNC: 34.5 G/DL (ref 32–36)
MCV RBC AUTO: 92 FL (ref 82–98)
MONOCYTES # BLD AUTO: 0.7 K/UL (ref 0.3–1)
MONOCYTES NFR BLD: 9.1 % (ref 4–15)
NEUTROPHILS # BLD AUTO: 5 K/UL (ref 1.8–7.7)
NEUTROPHILS NFR BLD: 64.8 % (ref 38–73)
NONHDLC SERPL-MCNC: 121 MG/DL
NRBC BLD-RTO: 0 /100 WBC
OHS QRS DURATION: 92 MS
OHS QTC CALCULATION: 450 MS
PLATELET # BLD AUTO: 238 K/UL (ref 150–450)
PMV BLD AUTO: 10.6 FL (ref 9.2–12.9)
POCT GLUCOSE: 121 MG/DL (ref 70–110)
POTASSIUM SERPL-SCNC: 3.2 MMOL/L (ref 3.5–5.1)
PROT SERPL-MCNC: 7 G/DL (ref 6–8.4)
PROTHROMBIN TIME: 10.7 SEC (ref 9–12.5)
RBC # BLD AUTO: 4.86 M/UL (ref 4–5.4)
SODIUM SERPL-SCNC: 138 MMOL/L (ref 136–145)
TRIGL SERPL-MCNC: 147 MG/DL (ref 30–150)
TROPONIN I SERPL DL<=0.01 NG/ML-MCNC: 38.3 PG/ML (ref 0–60)
TSH SERPL DL<=0.005 MIU/L-ACNC: 1.17 UIU/ML (ref 0.4–4)
WBC # BLD AUTO: 7.68 K/UL (ref 3.9–12.7)

## 2024-11-13 PROCEDURE — 85025 COMPLETE CBC W/AUTO DIFF WBC: CPT | Performed by: EMERGENCY MEDICINE

## 2024-11-13 PROCEDURE — 80061 LIPID PANEL: CPT | Performed by: EMERGENCY MEDICINE

## 2024-11-13 PROCEDURE — 25500020 PHARM REV CODE 255: Performed by: EMERGENCY MEDICINE

## 2024-11-13 PROCEDURE — 94760 N-INVAS EAR/PLS OXIMETRY 1: CPT

## 2024-11-13 PROCEDURE — 99900035 HC TECH TIME PER 15 MIN (STAT)

## 2024-11-13 PROCEDURE — 85610 PROTHROMBIN TIME: CPT | Performed by: EMERGENCY MEDICINE

## 2024-11-13 PROCEDURE — 85730 THROMBOPLASTIN TIME PARTIAL: CPT | Performed by: EMERGENCY MEDICINE

## 2024-11-13 PROCEDURE — 84484 ASSAY OF TROPONIN QUANT: CPT | Performed by: EMERGENCY MEDICINE

## 2024-11-13 PROCEDURE — 99222 1ST HOSP IP/OBS MODERATE 55: CPT | Mod: 95,,, | Performed by: PSYCHIATRY & NEUROLOGY

## 2024-11-13 PROCEDURE — 93005 ELECTROCARDIOGRAM TRACING: CPT

## 2024-11-13 PROCEDURE — 84443 ASSAY THYROID STIM HORMONE: CPT | Performed by: EMERGENCY MEDICINE

## 2024-11-13 PROCEDURE — 93010 ELECTROCARDIOGRAM REPORT: CPT | Mod: ,,, | Performed by: INTERNAL MEDICINE

## 2024-11-13 PROCEDURE — 80053 COMPREHEN METABOLIC PANEL: CPT | Performed by: EMERGENCY MEDICINE

## 2024-11-13 PROCEDURE — 25000003 PHARM REV CODE 250: Performed by: EMERGENCY MEDICINE

## 2024-11-13 PROCEDURE — 25000003 PHARM REV CODE 250: Performed by: STUDENT IN AN ORGANIZED HEALTH CARE EDUCATION/TRAINING PROGRAM

## 2024-11-13 PROCEDURE — 99223 1ST HOSP IP/OBS HIGH 75: CPT | Mod: ,,, | Performed by: STUDENT IN AN ORGANIZED HEALTH CARE EDUCATION/TRAINING PROGRAM

## 2024-11-13 PROCEDURE — 21400001 HC TELEMETRY ROOM

## 2024-11-13 PROCEDURE — 36415 COLL VENOUS BLD VENIPUNCTURE: CPT | Performed by: EMERGENCY MEDICINE

## 2024-11-13 RX ORDER — POTASSIUM CHLORIDE 20 MEQ/1
20 TABLET, EXTENDED RELEASE ORAL
Status: COMPLETED | OUTPATIENT
Start: 2024-11-13 | End: 2024-11-14

## 2024-11-13 RX ORDER — CLOPIDOGREL BISULFATE 75 MG/1
300 TABLET ORAL
Status: COMPLETED | OUTPATIENT
Start: 2024-11-13 | End: 2024-11-13

## 2024-11-13 RX ORDER — OXYCODONE HCL 10 MG/1
10 TABLET, FILM COATED, EXTENDED RELEASE ORAL EVERY 12 HOURS PRN
Status: DISCONTINUED | OUTPATIENT
Start: 2024-11-13 | End: 2024-11-14

## 2024-11-13 RX ORDER — DOCUSATE SODIUM 100 MG
200 CAPSULE ORAL
Status: DISCONTINUED | OUTPATIENT
Start: 2024-11-13 | End: 2024-11-14 | Stop reason: HOSPADM

## 2024-11-13 RX ORDER — LABETALOL 200 MG/1
TABLET, FILM COATED ORAL
Status: ON HOLD | COMMUNITY
Start: 2024-10-30 | End: 2024-11-14 | Stop reason: HOSPADM

## 2024-11-13 RX ORDER — NIFEDIPINE 90 MG/1
TABLET, EXTENDED RELEASE ORAL
COMMUNITY
Start: 2024-10-30

## 2024-11-13 RX ORDER — ASPIRIN 325 MG
325 TABLET ORAL
Status: COMPLETED | OUTPATIENT
Start: 2024-11-13 | End: 2024-11-13

## 2024-11-13 RX ORDER — LOSARTAN POTASSIUM AND HYDROCHLOROTHIAZIDE 25; 100 MG/1; MG/1
TABLET ORAL
Status: ON HOLD | COMMUNITY
Start: 2024-10-30 | End: 2024-11-14 | Stop reason: ALTCHOICE

## 2024-11-13 RX ORDER — SODIUM CHLORIDE 0.9 % (FLUSH) 0.9 %
10 SYRINGE (ML) INJECTION
Status: DISCONTINUED | OUTPATIENT
Start: 2024-11-13 | End: 2024-11-14 | Stop reason: HOSPADM

## 2024-11-13 RX ORDER — TALC
6 POWDER (GRAM) TOPICAL NIGHTLY PRN
Status: DISCONTINUED | OUTPATIENT
Start: 2024-11-13 | End: 2024-11-14 | Stop reason: HOSPADM

## 2024-11-13 RX ORDER — LOSARTAN POTASSIUM 25 MG/1
25 TABLET ORAL DAILY
COMMUNITY

## 2024-11-13 RX ORDER — ATORVASTATIN CALCIUM 80 MG/1
80 TABLET, FILM COATED ORAL DAILY
Status: DISCONTINUED | OUTPATIENT
Start: 2024-11-14 | End: 2024-11-14

## 2024-11-13 RX ORDER — ACETAMINOPHEN 325 MG/1
650 TABLET ORAL EVERY 6 HOURS PRN
Status: DISCONTINUED | OUTPATIENT
Start: 2024-11-13 | End: 2024-11-14 | Stop reason: HOSPADM

## 2024-11-13 RX ADMIN — Medication 6 MG: at 09:11

## 2024-11-13 RX ADMIN — POTASSIUM CHLORIDE 20 MEQ: 20 TABLET, EXTENDED RELEASE ORAL at 10:11

## 2024-11-13 RX ADMIN — ACETAMINOPHEN 650 MG: 325 TABLET ORAL at 09:11

## 2024-11-13 RX ADMIN — IOHEXOL 75 ML: 350 INJECTION, SOLUTION INTRAVENOUS at 02:11

## 2024-11-13 RX ADMIN — CLOPIDOGREL BISULFATE 300 MG: 75 TABLET ORAL at 02:11

## 2024-11-13 RX ADMIN — POTASSIUM CHLORIDE 20 MEQ: 20 TABLET, EXTENDED RELEASE ORAL at 09:11

## 2024-11-13 RX ADMIN — ASPIRIN 325 MG ORAL TABLET 325 MG: 325 PILL ORAL at 02:11

## 2024-11-13 RX ADMIN — Medication 200 ML: at 09:11

## 2024-11-13 NOTE — TELEMEDICINE CONSULT
[facility]  Vascular Neurology  Comprehensive Stroke Center  TeleVascular Neurology Acute Consultation Note        Consult Information  Consults    Consulting Provider: ABRAHAM PATEL   Current Providers  No providers found    Patient Location:  [facility] EMERGENCY DEPARTMENT Emergency Department    Spoke hospital nurse at bedside with patient assisting consultant.  Patient information was obtained from patient.       Stroke Documentation  Acute Stroke Times   Last Known Normal Date: 11/13/24  Last Known Normal Time: 1000  Stroke Team Called Date: 11/13/24  Stroke Team Called Time: 1402  Stroke Team Arrival Date: 11/13/24  Stroke Team Arrival Time: 1415  CT Interpretation Time: 1415  Thrombolytic Therapy Recommended: No  Thrombectomy Recommended: No    NIH Scale:  1a. Level of Consciousness: 0-->Alert, keenly responsive  1b. LOC Questions: 0-->Answers both questions correctly  1c. LOC Commands: 0-->Performs both tasks correctly  2. Best Gaze: 0-->Normal  3. Visual: 0-->No visual loss  4. Facial Palsy: 0-->Normal symmetrical movements  5a. Motor Arm, Left: 0-->No drift, limb holds 90 (or 45) degrees for full 10 secs  5b. Motor Arm, Right: 0-->No drift, limb holds 90 (or 45) degrees for full 10 secs  6a. Motor Leg, Left: 0-->No drift, leg holds 30 degree position for full 5 secs  6b. Motor Leg, Right: 0-->No drift, leg holds 30 degree position for full 5 secs  7. Limb Ataxia: 0-->Absent  8. Sensory: 0-->Normal, no sensory loss  9. Best Language: 1-->Mild-to-moderate aphasia, some obvious loss of fluency or facility of comprehension, without significant limitation on ideas expressed or form of expression. Reduction of speech and/or comprehension, however, makes conversation. . . (see row details)  10. Dysarthria: 1-->Mild-to-moderate dysarthria, patient slurs at least some words and, at worst, can be understood with some difficulty  11. Extinction and Inattention (formerly Neglect): 0-->No  "abnormality  Total (NIH Stroke Scale): 2      Modified [place]:    [place] Coma Scale:     ABCD2 Score:    [id number] Score:    HAS -BLED Score:    ICH Score:    Hunt & Rm Classification:      Blood pressure 123/68, pulse 77, temperature 98 °F (36.7 °C), resp. rate 18, height 5' 4" (1.626 m), weight 102.5 kg (226 lb), SpO2 98%, not currently breastfeeding.      In my opinion, this was a: Tier 1; VAN Stroke Assessment: Negative     Medical Decision Making  HPI:  49 y.o. female with h/o prior R frontal stroke, HTN, seizures, presenting with hand weakness and worsening of slurred speech.   She reports symptoms started around 10 am today.  She reports to me that L hand feels weaker than baseline, but noted to ED physician that R hand was weaker.     Images personally reviewed and interpreted:  Study: Head CT and CTA Head & Neck  Study Interpretation: remote R frontal stroke, narrowing of R MCA     Assessment and plan:  Patient presenting with worsening of baseline dysarthria +/- hadn weakness.  Given e/o vasculopathy she is at higher risk for stroke, and not currently on any antiplatelets or anticoag.  Recommend MRI brain in addition to infectious/metabolic workup.  Restart patient on DAPT with Plavid and Aspirin loads.  Given lower BPs, recommend IVF to maintain cerebral perfusion.     Lytics recommendation: Thrombolytic therapy not recommended due to Outside of treatment window     Thrombectomy recommendation: No; at this time symptoms not suggestive of large vessel occlusion  Placement recommendation: admit to inpatient               ROS  Physical Exam  Neurological:      Mental Status: She is oriented to person, place, and time.      Comments: Mild dysarthria and mild dysfluency, but able to name and repeat  No arm drift,  equal b/l  No leg drift  Denies numbness       Past Medical History:   Diagnosis Date    Anxiety     Depression     DVT (deep venous thrombosis)     Headache(784.0)     Hepatitis     Hep C " ab    History of blood clots     Hypertension     Insomnia     Pulmonary embolism     Seizures     Smoker     Stroke      Past Surgical History:   Procedure Laterality Date    BREAST BIOPSY Left     benign    BREAST MASS EXCISION       SECTION      CHOLECYSTECTOMY      FEMORAL ARTERY STENT      HYSTERECTOMY      LYMPHADENECTOMY      OOPHORECTOMY      UPPER GASTROINTESTINAL ENDOSCOPY       Family History   Problem Relation Name Age of Onset    Stroke Mother      Heart disease Mother      Heart attack Mother      Ovarian cancer Mother      Cancer Father      Lung cancer Father      Brain cancer Father      Stroke Sister      Breast cancer Sister      No Known Problems Daughter      No Known Problems Maternal Aunt      No Known Problems Maternal Uncle      No Known Problems Paternal Aunt      No Known Problems Paternal Uncle      No Known Problems Maternal Grandmother      No Known Problems Maternal Grandfather      No Known Problems Paternal Grandmother      No Known Problems Paternal Grandfather      BRCA 1/2 Neg Hx         Diagnoses  Problem Noted   Slurred Speech 2024       Phoebe Wilson MD      Emergent/Acute neurological consultation requested by spoke provider due to critical concerns for possible cerebrovascular event that could result in permanent loss of neurologic/bodily function, severe disability or death of this patient.  Immediate/timely evaluation by a highly prepared expert is paramount for optimal outcomes  High risk for neurological deterioration if not properly diagnosed  High risk for neurological deterioration if not treated promplty/as soon as possible  Complex diagnostic evaluation may be required (advanced imaging)  High risk treatment options (thrombolytics and/or thrombectomy)    Patient care was coordinated with spoke provider, including but not limted to    Discussing likely diagnosis/etiology of symptoms  Making recommendations for further diagnostic studies  Making  recommendations for intravenous thrombolytics or other advanced therapies  Making recommendations for disposition (admission/transfer for higher level of care)

## 2024-11-13 NOTE — LETTER
This communication is flagged as high priority.    Patient admitted 11/13/24. Discharging 11/14/24. Clinical information attached for review. Patient has f/u appointment scheduled for 11/21/24 at 10:15am.     Thank you,     Orlin Leyva RN Case Manager  Ph. [phone]  . [phone]  Email. [email or web address]

## 2024-11-13 NOTE — ED PROVIDER NOTES
"Encounter Date: 2024       History     Chief Complaint   Patient presents with    Speech Problem     Patient to the ER with complaints of worsening slurred speech onset 1000 this morning. History of stroke; speech deficits from previous strokes. Patient reports feeling generally unwell and "foggy." Patient reports, "My right arm is more weak today."     Patient with a history of strokes stating she has had 2 in the past leaving her with slurred speech and no other motor or sensory deficits presents emergency department stating that she feels like her slurred speech has worsened since approximately 10:00 a.m. in his been having weakness in her right upper extremity.  Her stroke in the past has called slurred speech but no motor or sensory deficits in his this is new for her.  She is not on any blood thinners.  She states she has a mild headache but denies any chest pain shortness of breath recent fevers illnesses nausea vomiting diarrhea or abdominal pain.      Review of patient's allergies indicates:   Allergen Reactions    Tegretol [carbamazepine] Swelling    Lamictal [lamotrigine] Rash     Past Medical History:   Diagnosis Date    Anxiety     Depression     DVT (deep venous thrombosis)     Headache(784.0)     Hepatitis     Hep C ab    History of blood clots     Hypertension     Insomnia     Pulmonary embolism     Seizures     Smoker     Stroke      Past Surgical History:   Procedure Laterality Date    BREAST BIOPSY Left     benign    BREAST MASS EXCISION       SECTION      CHOLECYSTECTOMY      FEMORAL ARTERY STENT      HYSTERECTOMY      LYMPHADENECTOMY      OOPHORECTOMY      UPPER GASTROINTESTINAL ENDOSCOPY       Family History   Problem Relation Name Age of Onset    Stroke Mother      Heart disease Mother      Heart attack Mother      Ovarian cancer Mother      Cancer Father      Lung cancer Father      Brain cancer Father      Stroke Sister      Breast cancer Sister      No Known Problems Daughter  "     No Known Problems Maternal Aunt      No Known Problems Maternal Uncle      No Known Problems Paternal Aunt      No Known Problems Paternal Uncle      No Known Problems Maternal Grandmother      No Known Problems Maternal Grandfather      No Known Problems Paternal Grandmother      No Known Problems Paternal Grandfather      BRCA 1/2 Neg Hx       Social History     Tobacco Use    Smoking status: Every Day     Current packs/day: 0.50     Types: Cigarettes    Smokeless tobacco: Never   Substance Use Topics    Alcohol use: No     Alcohol/week: 0.0 standard drinks of alcohol    Drug use: No     Review of Systems   Neurological:  Positive for speech difficulty, weakness and headaches.       Physical Exam     Initial Vitals [11/13/24 1332]   BP Pulse Resp Temp SpO2   (!) 112/51 72 18 98 °F (36.7 °C) 97 %      MAP       --         Physical Exam    Nursing note and vitals reviewed.  Constitutional: She appears well-developed and well-nourished. No distress.   HENT:   Head: Normocephalic and atraumatic.   Eyes: EOM are normal. Pupils are equal, round, and reactive to light.   Neck:   Normal range of motion.  Cardiovascular:  Normal rate and regular rhythm.           Pulmonary/Chest: No stridor. No respiratory distress. She has no wheezes.   Abdominal: Abdomen is soft. Bowel sounds are normal. She exhibits no distension.   Musculoskeletal:         General: Normal range of motion.      Cervical back: Normal range of motion.      Comments: Patient has slight decrease in  strength on the right compared to the left     Neurological: She is alert and oriented to person, place, and time. No cranial nerve deficit or sensory deficit.   Exhibits mild slurred speech   Psychiatric: She has a normal mood and affect.         ED Course   Procedures  Labs Reviewed   CBC W/ AUTO DIFFERENTIAL - Abnormal       Result Value    WBC 7.68      RBC 4.86      Hemoglobin 15.5      Hematocrit 44.9      MCV 92      MCH 31.9 (*)     MCHC 34.5       RDW 12.0      Platelets 238      MPV 10.6      Immature Granulocytes 0.1      Gran # (ANC) 5.0      Immature Grans (Abs) 0.01      Lymph # 1.8      Mono # 0.7      Eos # 0.2      Baso # 0.05      nRBC 0      Gran % 64.8      Lymph % 23.2      Mono % 9.1      Eosinophil % 2.1      Basophil % 0.7      Differential Method Automated     COMPREHENSIVE METABOLIC PANEL - Abnormal    Sodium 138      Potassium 3.2 (*)     Chloride 101      CO2 29      Glucose 106      BUN 17      Creatinine 1.2      Calcium 9.0      Total Protein 7.0      Albumin 3.9      Total Bilirubin 0.5      Alkaline Phosphatase 88      AST 23      ALT 37      eGFR 54.8 (*)     Anion Gap 8     LIPID PANEL - Abnormal    Cholesterol 159      Triglycerides 147      HDL 38 (*)     LDL Cholesterol 91.6      HDL/Cholesterol Ratio 23.9      Total Cholesterol/HDL Ratio 4.2      Non-HDL Cholesterol 121     POCT GLUCOSE - Abnormal    POCT Glucose 121 (*)    PROTIME-INR    Prothrombin Time 10.7      INR 0.9     TSH    TSH 1.170     APTT    aPTT 25.3     TROPONIN I HIGH SENSITIVITY    Troponin I High Sensitivity 38.3     POCT GLUCOSE, HAND-HELD DEVICE   POCT PROTIME-INR     EKG Readings: (Independently Interpreted)   Time 1:47 p.m.   Rate 74, sinus rhythm, left axis deviation, normal intervals, nonspecific STT wave abnormalities with signs of ventricular hypertrophy based on leads 1 in aVL amplitude, no significant change from 06/17/2022, no STEMI     ECG Results              ECG 12 lead (Final result)        Collection Time Result Time QRS Duration OHS QTC Calculation    11/13/24 13:47:43 11/13/24 17:09:34 92 450                     Final result by Interface, Lab In [facility] (11/13/24 17:09:36)                   Narrative:    Test Reason : R29.818,    Vent. Rate :  74 BPM     Atrial Rate :  74 BPM     P-R Int : 160 ms          QRS Dur :  92 ms      QT Int : 406 ms       P-R-T Axes :  40 -33 179 degrees    QTcB Int : 450 ms    Sinus rhythm with Premature atrial  complexes  Left axis deviation  LVH with repolarization abnormality  Anteroseptal infarct (cited on or before 27-Apr-2021)  Abnormal ECG  When compared with ECG of 17-Jun-2022 13:20,  Premature atrial complexes are now Present  Questionable change in initial forces of Septal leads  Inverted T waves have replaced nonspecific T wave abnormality in Inferior  leads  Confirmed by Lacie Alba ([id number]) on 11/13/2024 5:09:28 PM    Referred By: AAAREFERRAL SELF           Confirmed By: Lacie Alba                                  Imaging Results              CTA Head and Neck (xpd) (Final result)  Result time 11/13/24 14:34:09      Final result by Jack Crouch MD (11/13/24 14:34:09)                   Impression:      1. Motion on some images.  2. 50-70% stenosis proximal aspect of cervical segment left internal carotid artery.  3. No large vessel occlusion.      Electronically signed by: Jack Crouch MD  Date:    11/13/2024  Time:    14:34               Narrative:    EXAMINATION:  CTA HEAD AND NECK (XPD)    CLINICAL HISTORY:  Neuro deficit, acute, stroke suspected;    TECHNIQUE:  Thin section post IV contrast images were obtained through the cervical and cerebral arterial vasculature.  Multiplanar reformations were performed.  Images were reviewed at 3-D workstation as well.  Iterative reconstruction technique was used.   CT/cardiac nuclear exam/s in prior 12 months: 1.    COMPARISON:  None.    FINDINGS:  Motion artifact on some images.    Visualized thoracic aortic arch appears intact with no aneurysm or dissection.  There is atherosclerotic disease at the distal aspect of the right common carotid artery with a less than 50% stenosis.  There is a less than 50% stenosis at the proximal aspect of the cervical segment of right internal carotid artery.  Stenotic disease at the origin of the right external carotid artery.  The left common carotid artery is patent with no stenosis.  There is mixed plaque at the  proximal aspect of the cervical segment of left internal carotid artery resulting in a 50-70% stenosis.  The left external carotid artery is patent with no stenosis.    The right vertebral artery is patent with no stenosis.  The left vertebral artery is a small vessel and is patent with no stenosis.  Intracranial portion of the right internal carotid artery is patent with no stenosis or aneurysm.  The right middle and right anterior cerebral arteries are patent with no stenosis or aneurysm.  Intracranial portion of the left internal carotid artery is patent with no stenosis or aneurysm including the left middle and left anterior cerebral arteries.  The basilar artery is patent with no stenosis or aneurysm including the bilateral posterior cerebral arteries.    Visualized lung apices are clear.  Soft tissues of the neck appear unremarkable, allowing for the presence of motion artifact.  No masses or abnormal enhancement in the brain parenchyma, allowing for the presence of motion artifact.                                       CT HEAD FOR STROKE (Final result)  Result time 11/13/24 14:19:55      Final result by Jack Crouch MD (11/13/24 14:19:55)                   Impression:      Chronic ischemic disease.  No acute intracranial hemorrhage or CT evidence of acute ischemia.      Electronically signed by: Jack Crouch MD  Date:    11/13/2024  Time:    14:19               Narrative:    EXAMINATION:  CT HEAD FOR STROKE    CLINICAL HISTORY:  Neuro deficit, acute, stroke suspected;    TECHNIQUE:  Axial CT images were obtained. Iterative reconstruction technique was used.  CT/cardiac nuclear exam/s in prior 12 months: 1.    COMPARISON:  CT head without IV contrast 06/17/2022.    FINDINGS:  There is an old infarct in the right frontal lobe.  There is no acute intracranial hemorrhage.  There is no mass or mass effect.  No ventricular dilatation or abnormal extra-axial fluid collection.  No osseous abnormality.                                        Medications   sodium chloride 0.9% flush 10 mL (has no administration in time range)   melatonin tablet 6 mg (6 mg Oral Given 11/13/24 2114)   acetaminophen tablet 650 mg (650 mg Oral Given 11/13/24 2114)   electrolytes-dextrose (Pedialyte) oral solution 200 mL (200 mLs Oral Not Given 11/14/24 0200)   oxyCODONE 12 hr tablet 10 mg (has no administration in time range)   atorvastatin tablet 80 mg (has no administration in time range)   iohexoL (OMNIPAQUE 350) injection 75 mL (75 mLs Intravenous Given 11/13/24 1410)   aspirin tablet 325 mg (325 mg Oral Given 11/13/24 1449)   clopidogreL tablet 300 mg (300 mg Oral Given 11/13/24 1449)   potassium chloride SA CR tablet 20 mEq (20 mEq Oral Given 11/14/24 0253)     Medical Decision Making                        Medical Decision Making:   Initial Assessment:   Patient does exhibit slightly decreased  strength on right compared to left she states it started approximately at 10:00 a.m..  She also states that her speech is somewhat more slurred than normal in the  at bedside agrees.  Code stroke called with workup in progress  Differential Diagnosis:   CVA, neoplasm, anxiety, demyelinating disease, intracranial bleed  ED Management:  Time 2:43 p.m.   All labs within normal limits nonsignificant discussed case with tele neurologist who advised admission.  Advised to load with 300 mg of Plavix and 324 of aspirin.  Patient will be admitted for MRI with Neurology recommendations in EMR             Clinical Impression:  Final diagnoses:  [R29.818] Acute focal neurological deficit  [I63.9] Cerebrovascular accident (CVA), unspecified mechanism (Primary)          ED Disposition Condition    Admit Stable                Luis E Byrnes MD  11/14/24 0606

## 2024-11-13 NOTE — SUBJECTIVE & OBJECTIVE
HPI:  49 y.o. female with h/o prior R frontal stroke, HTN, seizures, presenting with hand weakness and worsening of slurred speech.   She reports symptoms started around 10 am today.  She reports to me that L hand feels weaker than baseline, but noted to ED physician that R hand was weaker.     Images personally reviewed and interpreted:  Study: Head CT and CTA Head & Neck  Study Interpretation: remote R frontal stroke, narrowing of R MCA     Assessment and plan:  Patient presenting with worsening of baseline dysarthria +/- hadn weakness.  Given e/o vasculopathy she is at higher risk for stroke, and not currently on any antiplatelets or anticoag.  Recommend MRI brain in addition to infectious/metabolic workup.  Restart patient on DAPT with Plavid and Aspirin loads.  Given lower BPs, recommend IVF to maintain cerebral perfusion.     Lytics recommendation: Thrombolytic therapy not recommended due to Outside of treatment window     Thrombectomy recommendation: No; at this time symptoms not suggestive of large vessel occlusion  Placement recommendation: admit to inpatient

## 2024-11-14 VITALS
HEART RATE: 50 BPM | SYSTOLIC BLOOD PRESSURE: 127 MMHG | HEIGHT: 64 IN | TEMPERATURE: 98 F | WEIGHT: 226 LBS | RESPIRATION RATE: 18 BRPM | OXYGEN SATURATION: 97 % | DIASTOLIC BLOOD PRESSURE: 94 MMHG | BODY MASS INDEX: 38.58 KG/M2

## 2024-11-14 LAB
ANION GAP SERPL CALC-SCNC: 7 MMOL/L (ref 3–11)
BUN SERPL-MCNC: 16 MG/DL (ref 6–20)
CALCIUM SERPL-MCNC: 8.8 MG/DL (ref 8.7–10.5)
CHLORIDE SERPL-SCNC: 101 MMOL/L (ref 95–110)
CO2 SERPL-SCNC: 28 MMOL/L (ref 23–29)
CREAT SERPL-MCNC: 1.1 MG/DL (ref 0.5–1.4)
ERYTHROCYTE [DISTWIDTH] IN BLOOD BY AUTOMATED COUNT: 11.9 % (ref 11.5–14.5)
EST. GFR  (NO RACE VARIABLE): >60 ML/MIN/1.73 M^2
GLUCOSE SERPL-MCNC: 110 MG/DL (ref 70–110)
HCT VFR BLD AUTO: 44.3 % (ref 37–48.5)
HGB BLD-MCNC: 15.1 G/DL (ref 12–16)
MAGNESIUM SERPL-MCNC: 1.7 MG/DL (ref 1.6–2.6)
MCH RBC QN AUTO: 32.1 PG (ref 27–31)
MCHC RBC AUTO-ENTMCNC: 34.1 G/DL (ref 32–36)
MCV RBC AUTO: 94 FL (ref 82–98)
PLATELET # BLD AUTO: 219 K/UL (ref 150–450)
PMV BLD AUTO: 11.3 FL (ref 9.2–12.9)
POTASSIUM SERPL-SCNC: 3.4 MMOL/L (ref 3.5–5.1)
RBC # BLD AUTO: 4.71 M/UL (ref 4–5.4)
SODIUM SERPL-SCNC: 136 MMOL/L (ref 136–145)
WBC # BLD AUTO: 6.95 K/UL (ref 3.9–12.7)

## 2024-11-14 PROCEDURE — 36415 COLL VENOUS BLD VENIPUNCTURE: CPT | Performed by: STUDENT IN AN ORGANIZED HEALTH CARE EDUCATION/TRAINING PROGRAM

## 2024-11-14 PROCEDURE — 80048 BASIC METABOLIC PNL TOTAL CA: CPT | Performed by: STUDENT IN AN ORGANIZED HEALTH CARE EDUCATION/TRAINING PROGRAM

## 2024-11-14 PROCEDURE — 99239 HOSP IP/OBS DSCHRG MGMT >30: CPT | Mod: ,,, | Performed by: STUDENT IN AN ORGANIZED HEALTH CARE EDUCATION/TRAINING PROGRAM

## 2024-11-14 PROCEDURE — 85027 COMPLETE CBC AUTOMATED: CPT | Performed by: STUDENT IN AN ORGANIZED HEALTH CARE EDUCATION/TRAINING PROGRAM

## 2024-11-14 PROCEDURE — 83735 ASSAY OF MAGNESIUM: CPT | Performed by: STUDENT IN AN ORGANIZED HEALTH CARE EDUCATION/TRAINING PROGRAM

## 2024-11-14 PROCEDURE — 25000003 PHARM REV CODE 250: Performed by: STUDENT IN AN ORGANIZED HEALTH CARE EDUCATION/TRAINING PROGRAM

## 2024-11-14 RX ORDER — LABETALOL 100 MG/1
200 TABLET, FILM COATED ORAL EVERY 12 HOURS
Status: DISCONTINUED | OUTPATIENT
Start: 2024-11-14 | End: 2024-11-14

## 2024-11-14 RX ORDER — LABETALOL 100 MG/1
100 TABLET, FILM COATED ORAL EVERY 12 HOURS
Status: DISCONTINUED | OUTPATIENT
Start: 2024-11-14 | End: 2024-11-14 | Stop reason: HOSPADM

## 2024-11-14 RX ORDER — LANOLIN ALCOHOL/MO/W.PET/CERES
400 CREAM (GRAM) TOPICAL 2 TIMES DAILY
Status: DISCONTINUED | OUTPATIENT
Start: 2024-11-14 | End: 2024-11-14 | Stop reason: HOSPADM

## 2024-11-14 RX ORDER — LOSARTAN POTASSIUM 25 MG/1
25 TABLET ORAL DAILY
Status: DISCONTINUED | OUTPATIENT
Start: 2024-11-14 | End: 2024-11-14 | Stop reason: HOSPADM

## 2024-11-14 RX ORDER — LANOLIN ALCOHOL/MO/W.PET/CERES
400 CREAM (GRAM) TOPICAL 2 TIMES DAILY
Qty: 60 TABLET | Refills: 0 | Status: SHIPPED | OUTPATIENT
Start: 2024-11-14

## 2024-11-14 RX ORDER — CLONIDINE HYDROCHLORIDE 0.1 MG/1
0.1 TABLET ORAL 2 TIMES DAILY
Status: DISCONTINUED | OUTPATIENT
Start: 2024-11-14 | End: 2024-11-14 | Stop reason: HOSPADM

## 2024-11-14 RX ADMIN — CLONIDINE HYDROCHLORIDE 0.1 MG: 0.1 TABLET ORAL at 08:11

## 2024-11-14 RX ADMIN — POTASSIUM CHLORIDE 20 MEQ: 20 TABLET, EXTENDED RELEASE ORAL at 02:11

## 2024-11-14 RX ADMIN — Medication 200 ML: at 10:11

## 2024-11-14 RX ADMIN — POTASSIUM CHLORIDE 20 MEQ: 20 TABLET, EXTENDED RELEASE ORAL at 12:11

## 2024-11-14 RX ADMIN — ATORVASTATIN CALCIUM 80 MG: 80 TABLET, FILM COATED ORAL at 08:11

## 2024-11-14 RX ADMIN — LOSARTAN POTASSIUM 25 MG: 25 TABLET, FILM COATED ORAL at 08:11

## 2024-11-14 NOTE — ASSESSMENT & PLAN NOTE
See above management. Acute on chronic from past CVA history. Improved overnight. No further complaints of neurological deficits.

## 2024-11-14 NOTE — ASSESSMENT & PLAN NOTE
See above management. Follow up MRI.   CT-scan of the brain no acute intracranial hemorrhage or ischemia noted.  11/14/24 MRI findings chronic ischemic disease, no acute intracranial findings.   Slurred speech improved back to patient's baseline. Patient and  endorse close follow up with primary neurologist outpatient.

## 2024-11-14 NOTE — PLAN OF CARE
POC reviewed w/ pt and purposeful rounding ongoing. Pt AAOx4 and independent. Speech slurred. Pt swallows w/o issue. Denies pain at this time. Hipolito mohamud and personal items w/ in reach.     Problem: Adult Inpatient Plan of Care  Goal: Plan of Care Review  Outcome: Progressing  Goal: Patient-Specific Goal (Individualized)  Outcome: Progressing  Goal: Absence of Hospital-Acquired Illness or Injury  Outcome: Progressing  Goal: Optimal Comfort and Wellbeing  Outcome: Progressing  Goal: Readiness for Transition of Care  Outcome: Progressing

## 2024-11-14 NOTE — H&P
"  [facility]  History & Physical    Patient Name: Mallorie Diego  MRN: [id number]  Patient Class: IP- Inpatient  Admission Date: 2024  Attending Physician: No att. providers found   Primary Care Provider: Karmen Bradford MD         Patient information was obtained from patient and ER records.     Subjective:     Principal Problem:Cerebrovascular accident (CVA)    Chief Complaint:   Chief Complaint   Patient presents with    Speech Problem     Patient to the ER with complaints of worsening slurred speech onset 1000 this morning. History of stroke; speech deficits from previous strokes. Patient reports feeling generally unwell and "foggy." Patient reports, "My right arm is more weak today."        HPI: 49 year old with hypertension, hyperlipidemia, peripheral artery disease s/p stents in bilateral iliac arteries, distal aorta, history of right frontal stroke, seizures, pulmonary emboli, presents with hand weakness and worsening of slurred speech.  She reports symptoms started around 10 am today.  She reports to me that L hand feels weaker than baseline, but noted to ED physician that R hand was weaker.    ER course: vital signs on arrival  /51 mmHg, HR 72, RR 18, temp 98F, SpO2 97% on room air.   Head CT findings of no acute intracranial processes, remote right frontal stroke, CTA Head & Neck no large vessel occlusive disease, 50-70% stenosis proximal aspect of cervical segment left internal carotid artery.  Tele neurology consulted with recommendations to start DAPT and follow up MRI study.       Past Medical History:   Diagnosis Date    Anxiety     Depression     DVT (deep venous thrombosis)     Headache(784.0)     Hepatitis     Hep C ab    History of blood clots     Hypertension     Insomnia     Pulmonary embolism     Seizures     Smoker     Stroke        Past Surgical History:   Procedure Laterality Date    BREAST BIOPSY Left     benign    BREAST MASS EXCISION       " SECTION      CHOLECYSTECTOMY      FEMORAL ARTERY STENT      HYSTERECTOMY      LYMPHADENECTOMY      OOPHORECTOMY      UPPER GASTROINTESTINAL ENDOSCOPY         Review of patient's allergies indicates:   Allergen Reactions    Tegretol [carbamazepine] Swelling    Lamictal [lamotrigine] Rash       No current facility-administered medications on file prior to encounter.     Current Outpatient Medications on File Prior to Encounter   Medication Sig    cloNIDine (CATAPRES) 0.1 MG tablet Take 0.1 mg by mouth 2 (two) times daily.    labetaloL (NORMODYNE) 200 MG tablet TAKE ONE TABLET BY MOUTH TWICE A DAY THANK YOU    losartan-hydrochlorothiazide 100-25 mg (HYZAAR) 100-25 mg per tablet TAKE ONE TABLET BY MOUTH ONCE A DAY THANK YOU    NIFEdipine (ADALAT CC) 90 MG TbSR TAKE ONE TABLET BY MOUTH ONCE A DAY THANK YOU    albuterol (PROVENTIL/VENTOLIN HFA) 90 mcg/actuation inhaler Inhale 2 puffs into the lungs every 6 (six) hours as needed for Wheezing.    apixaban (ELIQUIS) 5 mg Tab Take 1 tablet (5 mg total) by mouth 2 (two) times daily.    aspirin (ECOTRIN) 81 MG EC tablet Take 1 tablet (81 mg total) by mouth once daily.    butalbital-acetaminophen-caffeine -40 mg (FIORICET, ESGIC) -40 mg per tablet Take 1 tablet by mouth every 4 to 6 hours as needed for Headaches.    clonazePAM (KLONOPIN) 2 MG Tab One table one to two times daily ONLY as needed for severe anxiety.    cloNIDine (CATAPRES) 0.2 MG tablet Take 0.1 mg by mouth 2 (two) times daily.    diazePAM (VALIUM) 5 MG tablet Take 2 tablets (10 mg total) by mouth every 8 (eight) hours as needed for Anxiety.    labetaloL (NORMODYNE) 100 MG tablet Take 200 mg by mouth 2 (two) times daily.    naproxen (NAPROSYN) 500 MG tablet Take 1 tablet (500 mg total) by mouth 2 (two) times daily.    ondansetron (ZOFRAN) 4 MG tablet Take 1 tablet (4 mg total) by mouth every 6 (six) hours.    ondansetron (ZOFRAN-ODT) 4 MG TbDL Take 1 tablet (4 mg total) by mouth every 8 (eight) hours as  needed ([place]).    PYRIDIUM 200 mg tablet Take 200 mg by mouth 3 (three) times daily.    rosuvastatin (CRESTOR) 20 MG tablet Take 1 tablet (20 mg total) by mouth once daily.     Family History       Problem Relation (Age of Onset)    Brain cancer Father    Breast cancer Sister    Cancer Father    Heart attack Mother    Heart disease Mother    Lung cancer Father    No Known Problems Daughter, Maternal Aunt, Maternal Uncle, Paternal Aunt, Paternal Uncle, Maternal Grandmother, Maternal Grandfather, Paternal Grandmother, Paternal Grandfather    Ovarian cancer Mother    Stroke Mother, Sister          Tobacco Use    Smoking status: Every Day     Current packs/day: 0.50     Types: Cigarettes    Smokeless tobacco: Never   Substance and Sexual Activity    Alcohol use: No     Alcohol/week: 0.0 standard drinks of alcohol    Drug use: No    Sexual activity: Not on file     Review of Systems   Constitutional:  Positive for activity change and fatigue. Negative for appetite change and diaphoresis.   Eyes:  Negative for visual disturbance.   Respiratory:  Negative for cough, chest tightness and shortness of breath.    Cardiovascular:  Negative for chest pain and palpitations.   Gastrointestinal:  Negative for abdominal distention, abdominal pain, constipation, diarrhea, nausea and vomiting.   Musculoskeletal:  Negative for arthralgias, back pain, myalgias and neck stiffness.   Neurological:  Positive for dizziness and speech difficulty.     Objective:     Vital Signs (Most Recent):  Temp: 97.8 °F (36.6 °C) (11/13/24 1913)  Pulse: 69 (11/13/24 1922)  Resp: 16 (11/13/24 1621)  BP: 114/74 (11/13/24 1633)  SpO2: 96 % (11/13/24 1637) Vital Signs (24h Range):  Temp:  [97.8 °F (36.6 °C)-98 °F (36.7 °C)] 97.8 °F (36.6 °C)  Pulse:  [65-77] 69  Resp:  [16-18] 16  SpO2:  [93 %-98 %] 96 %  BP: (112-132)/(51-91) 114/74     Weight: 102.5 kg (226 lb)  Body mass index is 38.79 kg/m².     Physical Exam  Vitals and nursing note reviewed.    Constitutional:       General: She is not in acute distress.     Appearance: Normal appearance. She is not ill-appearing.   HENT:      Head: Normocephalic and atraumatic.      Nose: Nose normal.      Mouth/Throat:      Mouth: Mucous membranes are dry.      Pharynx: Oropharynx is clear.   Eyes:      Extraocular Movements: Extraocular movements intact.      Conjunctiva/sclera: Conjunctivae normal.   Cardiovascular:      Rate and Rhythm: Normal rate and regular rhythm.      Pulses: Normal pulses.      Heart sounds: Normal heart sounds.   Pulmonary:      Effort: Pulmonary effort is normal. No respiratory distress.      Breath sounds: No stridor. No wheezing, rhonchi or rales.   Abdominal:      General: Abdomen is flat. Bowel sounds are normal.      Palpations: Abdomen is soft.   Musculoskeletal:         General: No tenderness. Normal range of motion.      Cervical back: Normal range of motion and neck supple. No rigidity or tenderness.   Skin:     General: Skin is warm and dry.      Capillary Refill: Capillary refill takes less than 2 seconds.   Neurological:      General: No focal deficit present.      Mental Status: She is alert.      Cranial Nerves: Dysarthria (mild, slurred speech) present.      Motor: Motor function is intact. No weakness, tremor, abnormal muscle tone or pronator drift.      Gait: Gait normal.   Psychiatric:         Mood and Affect: Mood normal.         Behavior: Behavior normal.                Significant Labs: All pertinent labs within the past 24 hours have been reviewed.  Bilirubin:   Recent Labs   Lab 11/13/24  1401   BILITOT 0.5     Recent Labs   Lab 11/13/24  1401         K 3.2*      CO2 29   BUN 17   CREATININE 1.2   CALCIUM 9.0     CBC:   Recent Labs   Lab 11/13/24  1401   WBC 7.68   HGB 15.5   HCT 44.9        CMP:   Recent Labs   Lab 11/13/24  1401      K 3.2*      CO2 29      BUN 17   CREATININE 1.2   CALCIUM 9.0   PROT 7.0   ALBUMIN 3.9  "  BILITOT 0.5   ALKPHOS 88   AST 23   ALT 37   ANIONGAP 8      Recent Labs   Lab 11/13/24  1401   INR 0.9   APTT 25.3     Lipid Panel:   Recent Labs   Lab 11/13/24  1401   CHOL 159   HDL 38*   LDLCALC 91.6   TRIG 147   CHOLHDL 23.9     Magnesium: No results for input(s): "MG" in the last 48 hours.  POCT Glucose:   Recent Labs   Lab 11/13/24  1347   POCTGLUCOSE 121*     Recent Labs   Lab 11/13/24  1401   TROPONINIHS 38.3     TSH:   Recent Labs   Lab 11/13/24  1401   TSH 1.170     CTA Head and Neck (xpd)  Narrative: EXAMINATION:  CTA HEAD AND NECK (XPD)    CLINICAL HISTORY:  Neuro deficit, acute, stroke suspected;    TECHNIQUE:  Thin section post IV contrast images were obtained through the cervical and cerebral arterial vasculature.  Multiplanar reformations were performed.  Images were reviewed at 3-D workstation as well.  Iterative reconstruction technique was used.   CT/cardiac nuclear exam/s in prior 12 months: 1.    COMPARISON:  None.    FINDINGS:  Motion artifact on some images.    Visualized thoracic aortic arch appears intact with no aneurysm or dissection.  There is atherosclerotic disease at the distal aspect of the right common carotid artery with a less than 50% stenosis.  There is a less than 50% stenosis at the proximal aspect of the cervical segment of right internal carotid artery.  Stenotic disease at the origin of the right external carotid artery.  The left common carotid artery is patent with no stenosis.  There is mixed plaque at the proximal aspect of the cervical segment of left internal carotid artery resulting in a 50-70% stenosis.  The left external carotid artery is patent with no stenosis.    The right vertebral artery is patent with no stenosis.  The left vertebral artery is a small vessel and is patent with no stenosis.  Intracranial portion of the right internal carotid artery is patent with no stenosis or aneurysm.  The right middle and right anterior cerebral arteries are patent with " no stenosis or aneurysm.  Intracranial portion of the left internal carotid artery is patent with no stenosis or aneurysm including the left middle and left anterior cerebral arteries.  The basilar artery is patent with no stenosis or aneurysm including the bilateral posterior cerebral arteries.    Visualized lung apices are clear.  Soft tissues of the neck appear unremarkable, allowing for the presence of motion artifact.  No masses or abnormal enhancement in the brain parenchyma, allowing for the presence of motion artifact.  Impression: 1. Motion on some images.  2. 50-70% stenosis proximal aspect of cervical segment left internal carotid artery.  3. No large vessel occlusion.    Electronically signed by: Jack Crouch MD  Date:    11/13/2024  Time:    14:34      CT HEAD FOR STROKE  Narrative: EXAMINATION:  CT HEAD FOR STROKE    CLINICAL HISTORY:  Neuro deficit, acute, stroke suspected;    TECHNIQUE:  Axial CT images were obtained. Iterative reconstruction technique was used.  CT/cardiac nuclear exam/s in prior 12 months: 1.    COMPARISON:  CT head without IV contrast 06/17/2022.    FINDINGS:  There is an old infarct in the right frontal lobe.  There is no acute intracranial hemorrhage.  There is no mass or mass effect.  No ventricular dilatation or abnormal extra-axial fluid collection.  No osseous abnormality.  Impression: Chronic ischemic disease.  No acute intracranial hemorrhage or CT evidence of acute ischemia.    Electronically signed by: Jack Crouch MD  Date:    11/13/2024  Time:    14:19     Significant Imaging: I have reviewed all pertinent imaging results/findings within the past 24 hours.  Assessment/Plan:     * Cerebrovascular accident (CVA)  Patient presenting with worsening of baseline dysarthria +/- hadn weakness. Carotid artery disease, patient at higher risk for stroke, per neurology, started on DAPT aspirin and plavix.   Recommend MRI brain in addition to infectious/metabolic workup.    Medical chart review, dysarthria, speech deficit noted in 8/2021 with inconsistent follow up with speech therapy outpatient.   Antithrombotics for secondary stroke prevention: Antiplatelets: Aspirin: 81 mg daily  Clopidogrel: 75 mg daily    Statins for secondary stroke prevention and hyperlipidemia, if present:   Statins: Other cholesterol lowering medications:      Aggressive risk factor modification: HTN, Smoking, HLD, Diet, Obesity, CAD     Rehab efforts: The patient has been evaluated by a stroke team provider and the therapy needs have been fully considered based off the presenting complaints and exam findings. The following therapy evaluations are needed: SLP evaluate and treat    Diagnostics ordered/pending: MRI head without contrast to assess brain parenchyma    VTE prophylaxis: Mechanical prophylaxis: Place SCDs    BP parameters: TIA: SBP <220 until imaging confirmation of no infarct         Hypokalemia  Patient's most recent potassium results are listed below.   Recent Labs     11/13/24  1401   K 3.2*     Plan  - Replete potassium per protocol  - Monitor potassium Daily  - replete KCl 20 mEQ x4      Chronic, continuous use of opioids  Monitor for withdrawal signs. Currently taking oxycodone 10 mg BID PRN      Acute focal neurological deficit  See above management. Follow up MRI.   CT-scan of the brain no acute intracranial hemorrhage or ischemia noted      Pulmonary embolism  Reconcile whether eliquis BID is current. Denies symptoms at this time.       Slurred speech  See above management. Acute on chronic from past CVA history.       VTE Risk Mitigation (From admission, onward)           Ordered     IP VTE HIGH RISK PATIENT  Once         11/13/24 1450     Place sequential compression device  Until discontinued         11/13/24 1450                                    Dom Lomax DO  Department of Hospital Medicine  [facility]

## 2024-11-14 NOTE — SUBJECTIVE & OBJECTIVE
Interval History: Patient seen and examined.     Review of Systems   Constitutional:  Negative for activity change, appetite change, diaphoresis and fatigue.   Eyes:  Negative for visual disturbance.   Respiratory:  Negative for cough, chest tightness and shortness of breath.    Cardiovascular:  Negative for chest pain and palpitations.   Gastrointestinal:  Negative for abdominal distention, abdominal pain, constipation, diarrhea, nausea and vomiting.   Musculoskeletal:  Negative for arthralgias, back pain, myalgias and neck stiffness.   Neurological:  Negative for dizziness and speech difficulty (back to baseline).     Objective:     Vital Signs (Most Recent):  Temp: 98.3 °F (36.8 °C) (11/14/24 0031)  Pulse: (!) 50 (11/14/24 0713)  Resp: 18 (11/14/24 0031)  BP: (!) 127/94 (11/14/24 0822)  SpO2: 97 % (11/14/24 0031) Vital Signs (24h Range):  Temp:  [97.8 °F (36.6 °C)-98.3 °F (36.8 °C)] 98.3 °F (36.8 °C)  Pulse:  [50-77] 50  Resp:  [16-18] 18  SpO2:  [93 %-98 %] 97 %  BP: (112-132)/(51-94) 127/94     Weight: 102.5 kg (226 lb)  Body mass index is 38.79 kg/m².    Intake/Output Summary (Last 24 hours) at 11/14/2024 1249  Last data filed at 11/14/2024 0920  Gross per 24 hour   Intake 2200 ml   Output --   Net 2200 ml         Physical Exam  Vitals and nursing note reviewed.   Constitutional:       General: She is not in acute distress.     Appearance: Normal appearance. She is not ill-appearing.   HENT:      Head: Normocephalic and atraumatic.      Nose: Nose normal.      Mouth/Throat:      Mouth: Mucous membranes are dry.      Pharynx: Oropharynx is clear.   Eyes:      Extraocular Movements: Extraocular movements intact.      Conjunctiva/sclera: Conjunctivae normal.   Cardiovascular:      Rate and Rhythm: Normal rate and regular rhythm.      Pulses: Normal pulses.      Heart sounds: Normal heart sounds.   Pulmonary:      Effort: Pulmonary effort is normal. No respiratory distress.      Breath sounds: No stridor. No  wheezing, rhonchi or rales.   Abdominal:      General: Abdomen is flat. Bowel sounds are normal.      Palpations: Abdomen is soft.   Musculoskeletal:         General: No tenderness. Normal range of motion.      Cervical back: Normal range of motion and neck supple. No rigidity or tenderness.   Skin:     General: Skin is warm and dry.      Capillary Refill: Capillary refill takes less than 2 seconds.   Neurological:      General: No focal deficit present.      Mental Status: She is alert.      Cranial Nerves: Dysarthria present.      Motor: Motor function is intact. No weakness, tremor, abnormal muscle tone or pronator drift.      Gait: Gait normal.   Psychiatric:         Mood and Affect: Mood normal.         Behavior: Behavior normal.             Significant Labs: All pertinent labs within the past 24 hours have been reviewed.    Recent Labs   Lab 11/14/24  0527         K 3.4*      CO2 28   BUN 16   CREATININE 1.1   CALCIUM 8.8   MG 1.7     CBC:   Recent Labs   Lab 11/13/24  1401 11/14/24  0527   WBC 7.68 6.95   HGB 15.5 15.1   HCT 44.9 44.3    219     CMP:   Recent Labs   Lab 11/13/24  1401 11/14/24  0527    136   K 3.2* 3.4*    101   CO2 29 28    110   BUN 17 16   CREATININE 1.2 1.1   CALCIUM 9.0 8.8   PROT 7.0  --    ALBUMIN 3.9  --    BILITOT 0.5  --    ALKPHOS 88  --    AST 23  --    ALT 37  --    ANIONGAP 8 7     Recent Labs   Lab 11/13/24  1401   INR 0.9   APTT 25.3      Recent Labs   Lab 11/13/24  1401   CHOL 159   HDL 38*   LDLCALC 91.6   TRIG 147   CHOLHDL 23.9     Magnesium:   Recent Labs   Lab 11/14/24  0527   MG 1.7     POCT Glucose:   Recent Labs   Lab 11/13/24  1347   POCTGLUCOSE 121*     MRI Brain Without Contrast  Narrative: EXAMINATION:  MRI BRAIN WITHOUT CONTRAST    CLINICAL HISTORY:  Transient ischemic attack (TIA);Stroke, follow up;    TECHNIQUE:  MRI of the brain was performed without IV contrast using multiple planes and  sequences.    COMPARISON:  CT head without IV contrast 11/13/2024, MRI brain without IV contrast 06/17/2022.    FINDINGS:  Motion on some images.    Old right frontoparietal infarct.  Scattered areas of T2 hyperintensity in the cerebral white matter.  No areas of restricted diffusion to indicate acute ischemia.  There is no mass or mass effect.  No evidence of intracranial hemorrhage.  No ventricular dilatation.  Normal flow voids in the visualized cerebral vasculature.  Left mastoid disease.  Craniocervical junction appears intact and images of the sella demonstrate no abnormality.  Impression: Chronic ischemic disease.  No evidence of acute ischemia.    Left mastoid disease.    Electronically signed by: Jack Crouch MD  Date:    11/14/2024  Time:    11:01     Significant Imaging: I have reviewed all pertinent imaging results/findings within the past 24 hours.

## 2024-11-14 NOTE — SUBJECTIVE & OBJECTIVE
Past Medical History:   Diagnosis Date    Anxiety     Depression     DVT (deep venous thrombosis)     Headache(784.0)     Hepatitis     Hep C ab    History of blood clots     Hypertension     Insomnia     Pulmonary embolism     Seizures     Smoker     Stroke        Past Surgical History:   Procedure Laterality Date    BREAST BIOPSY Left     benign    BREAST MASS EXCISION       SECTION      CHOLECYSTECTOMY      FEMORAL ARTERY STENT      HYSTERECTOMY      LYMPHADENECTOMY      OOPHORECTOMY      UPPER GASTROINTESTINAL ENDOSCOPY         Review of patient's allergies indicates:   Allergen Reactions    Tegretol [carbamazepine] Swelling    Lamictal [lamotrigine] Rash       No current facility-administered medications on file prior to encounter.     Current Outpatient Medications on File Prior to Encounter   Medication Sig    cloNIDine (CATAPRES) 0.1 MG tablet Take 0.1 mg by mouth 2 (two) times daily.    labetaloL (NORMODYNE) 200 MG tablet TAKE ONE TABLET BY MOUTH TWICE A DAY THANK YOU    losartan-hydrochlorothiazide 100-25 mg (HYZAAR) 100-25 mg per tablet TAKE ONE TABLET BY MOUTH ONCE A DAY THANK YOU    NIFEdipine (ADALAT CC) 90 MG TbSR TAKE ONE TABLET BY MOUTH ONCE A DAY THANK YOU    albuterol (PROVENTIL/VENTOLIN HFA) 90 mcg/actuation inhaler Inhale 2 puffs into the lungs every 6 (six) hours as needed for Wheezing.    apixaban (ELIQUIS) 5 mg Tab Take 1 tablet (5 mg total) by mouth 2 (two) times daily.    aspirin (ECOTRIN) 81 MG EC tablet Take 1 tablet (81 mg total) by mouth once daily.    butalbital-acetaminophen-caffeine -40 mg (FIORICET, ESGIC) -40 mg per tablet Take 1 tablet by mouth every 4 to 6 hours as needed for Headaches.    clonazePAM (KLONOPIN) 2 MG Tab One table one to two times daily ONLY as needed for severe anxiety.    cloNIDine (CATAPRES) 0.2 MG tablet Take 0.1 mg by mouth 2 (two) times daily.    diazePAM (VALIUM) 5 MG tablet Take 2 tablets (10 mg total) by mouth every 8 (eight) hours as  needed for Anxiety.    labetaloL (NORMODYNE) 100 MG tablet Take 200 mg by mouth 2 (two) times daily.    naproxen (NAPROSYN) 500 MG tablet Take 1 tablet (500 mg total) by mouth 2 (two) times daily.    ondansetron (ZOFRAN) 4 MG tablet Take 1 tablet (4 mg total) by mouth every 6 (six) hours.    ondansetron (ZOFRAN-ODT) 4 MG TbDL Take 1 tablet (4 mg total) by mouth every 8 (eight) hours as needed ([place]).    PYRIDIUM 200 mg tablet Take 200 mg by mouth 3 (three) times daily.    rosuvastatin (CRESTOR) 20 MG tablet Take 1 tablet (20 mg total) by mouth once daily.     Family History       Problem Relation (Age of Onset)    Brain cancer Father    Breast cancer Sister    Cancer Father    Heart attack Mother    Heart disease Mother    Lung cancer Father    No Known Problems Daughter, Maternal Aunt, Maternal Uncle, Paternal Aunt, Paternal Uncle, Maternal Grandmother, Maternal Grandfather, Paternal Grandmother, Paternal Grandfather    Ovarian cancer Mother    Stroke Mother, Sister          Tobacco Use    Smoking status: Every Day     Current packs/day: 0.50     Types: Cigarettes    Smokeless tobacco: Never   Substance and Sexual Activity    Alcohol use: No     Alcohol/week: 0.0 standard drinks of alcohol    Drug use: No    Sexual activity: Not on file     Review of Systems   Constitutional:  Positive for activity change and fatigue. Negative for appetite change and diaphoresis.   Eyes:  Negative for visual disturbance.   Respiratory:  Negative for cough, chest tightness and shortness of breath.    Cardiovascular:  Negative for chest pain and palpitations.   Gastrointestinal:  Negative for abdominal distention, abdominal pain, constipation, diarrhea, nausea and vomiting.   Musculoskeletal:  Negative for arthralgias, back pain, myalgias and neck stiffness.   Neurological:  Positive for dizziness and speech difficulty.     Objective:     Vital Signs (Most Recent):  Temp: 97.8 °F (36.6 °C) (11/13/24 1913)  Pulse: 69 (11/13/24  1922)  Resp: 16 (11/13/24 1621)  BP: 114/74 (11/13/24 1633)  SpO2: 96 % (11/13/24 1637) Vital Signs (24h Range):  Temp:  [97.8 °F (36.6 °C)-98 °F (36.7 °C)] 97.8 °F (36.6 °C)  Pulse:  [65-77] 69  Resp:  [16-18] 16  SpO2:  [93 %-98 %] 96 %  BP: (112-132)/(51-91) 114/74     Weight: 102.5 kg (226 lb)  Body mass index is 38.79 kg/m².     Physical Exam  Vitals and nursing note reviewed.   Constitutional:       General: She is not in acute distress.     Appearance: Normal appearance. She is not ill-appearing.   HENT:      Head: Normocephalic and atraumatic.      Nose: Nose normal.      Mouth/Throat:      Mouth: Mucous membranes are dry.      Pharynx: Oropharynx is clear.   Eyes:      Extraocular Movements: Extraocular movements intact.      Conjunctiva/sclera: Conjunctivae normal.   Cardiovascular:      Rate and Rhythm: Normal rate and regular rhythm.      Pulses: Normal pulses.      Heart sounds: Normal heart sounds.   Pulmonary:      Effort: Pulmonary effort is normal. No respiratory distress.      Breath sounds: No stridor. No wheezing, rhonchi or rales.   Abdominal:      General: Abdomen is flat. Bowel sounds are normal.      Palpations: Abdomen is soft.   Musculoskeletal:         General: No tenderness. Normal range of motion.      Cervical back: Normal range of motion and neck supple. No rigidity or tenderness.   Skin:     General: Skin is warm and dry.      Capillary Refill: Capillary refill takes less than 2 seconds.   Neurological:      General: No focal deficit present.      Mental Status: She is alert.      Cranial Nerves: Dysarthria (mild, slurred speech) present.      Motor: Motor function is intact. No weakness, tremor, abnormal muscle tone or pronator drift.      Gait: Gait normal.   Psychiatric:         Mood and Affect: Mood normal.         Behavior: Behavior normal.                Significant Labs: All pertinent labs within the past 24 hours have been reviewed.  Bilirubin:   Recent Labs   Lab  "11/13/24  1401   BILITOT 0.5     Recent Labs   Lab 11/13/24  1401         K 3.2*      CO2 29   BUN 17   CREATININE 1.2   CALCIUM 9.0     CBC:   Recent Labs   Lab 11/13/24  1401   WBC 7.68   HGB 15.5   HCT 44.9        CMP:   Recent Labs   Lab 11/13/24  1401      K 3.2*      CO2 29      BUN 17   CREATININE 1.2   CALCIUM 9.0   PROT 7.0   ALBUMIN 3.9   BILITOT 0.5   ALKPHOS 88   AST 23   ALT 37   ANIONGAP 8      Recent Labs   Lab 11/13/24  1401   INR 0.9   APTT 25.3     Lipid Panel:   Recent Labs   Lab 11/13/24  1401   CHOL 159   HDL 38*   LDLCALC 91.6   TRIG 147   CHOLHDL 23.9     Magnesium: No results for input(s): "MG" in the last 48 hours.  POCT Glucose:   Recent Labs   Lab 11/13/24  1347   POCTGLUCOSE 121*     Recent Labs   Lab 11/13/24  1401   TROPONINIHS 38.3     TSH:   Recent Labs   Lab 11/13/24  1401   TSH 1.170     CTA Head and Neck (xpd)  Narrative: EXAMINATION:  CTA HEAD AND NECK (XPD)    CLINICAL HISTORY:  Neuro deficit, acute, stroke suspected;    TECHNIQUE:  Thin section post IV contrast images were obtained through the cervical and cerebral arterial vasculature.  Multiplanar reformations were performed.  Images were reviewed at 3-D workstation as well.  Iterative reconstruction technique was used.   CT/cardiac nuclear exam/s in prior 12 months: 1.    COMPARISON:  None.    FINDINGS:  Motion artifact on some images.    Visualized thoracic aortic arch appears intact with no aneurysm or dissection.  There is atherosclerotic disease at the distal aspect of the right common carotid artery with a less than 50% stenosis.  There is a less than 50% stenosis at the proximal aspect of the cervical segment of right internal carotid artery.  Stenotic disease at the origin of the right external carotid artery.  The left common carotid artery is patent with no stenosis.  There is mixed plaque at the proximal aspect of the cervical segment of left internal carotid artery " resulting in a 50-70% stenosis.  The left external carotid artery is patent with no stenosis.    The right vertebral artery is patent with no stenosis.  The left vertebral artery is a small vessel and is patent with no stenosis.  Intracranial portion of the right internal carotid artery is patent with no stenosis or aneurysm.  The right middle and right anterior cerebral arteries are patent with no stenosis or aneurysm.  Intracranial portion of the left internal carotid artery is patent with no stenosis or aneurysm including the left middle and left anterior cerebral arteries.  The basilar artery is patent with no stenosis or aneurysm including the bilateral posterior cerebral arteries.    Visualized lung apices are clear.  Soft tissues of the neck appear unremarkable, allowing for the presence of motion artifact.  No masses or abnormal enhancement in the brain parenchyma, allowing for the presence of motion artifact.  Impression: 1. Motion on some images.  2. 50-70% stenosis proximal aspect of cervical segment left internal carotid artery.  3. No large vessel occlusion.    Electronically signed by: Jack Crouch MD  Date:    11/13/2024  Time:    14:34      CT HEAD FOR STROKE  Narrative: EXAMINATION:  CT HEAD FOR STROKE    CLINICAL HISTORY:  Neuro deficit, acute, stroke suspected;    TECHNIQUE:  Axial CT images were obtained. Iterative reconstruction technique was used.  CT/cardiac nuclear exam/s in prior 12 months: 1.    COMPARISON:  CT head without IV contrast 06/17/2022.    FINDINGS:  There is an old infarct in the right frontal lobe.  There is no acute intracranial hemorrhage.  There is no mass or mass effect.  No ventricular dilatation or abnormal extra-axial fluid collection.  No osseous abnormality.  Impression: Chronic ischemic disease.  No acute intracranial hemorrhage or CT evidence of acute ischemia.    Electronically signed by: Jack Crouch MD  Date:    11/13/2024  Time:    14:19     Significant  Imaging: I have reviewed all pertinent imaging results/findings within the past 24 hours.

## 2024-11-14 NOTE — ASSESSMENT & PLAN NOTE
Patient presenting with worsening of baseline dysarthria +/- hadn weakness. Carotid artery disease, patient at higher risk for stroke, per neurology, started on DAPT aspirin and plavix.   Recommend MRI brain in addition to infectious/metabolic workup.   Medical chart review, dysarthria, speech deficit noted in 8/2021 with inconsistent follow up with speech therapy outpatient.   Antithrombotics for secondary stroke prevention: Antiplatelets: Aspirin: 81 mg daily  Patient endorses chest tightness with use of clopidogrel.   Discontinue daily Clopidogrel: 75 mg, continue with daily baby aspirin.     Statins for secondary stroke prevention and hyperlipidemia, if present:   Statins: Other cholesterol lowering medications:      Aggressive risk factor modification: HTN, Smoking, HLD, Diet, Obesity, CAD     Rehab efforts: The patient has been evaluated by a stroke team provider and the therapy needs have been fully considered based off the presenting complaints and exam findings. The following therapy evaluations are needed: SLP evaluate and treat    Diagnostics ordered/pending: MRI head without contrast to assess brain parenchyma, MRI findings of chronic ischemic disease    VTE prophylaxis: Mechanical prophylaxis: Place SCDs    BP parameters: TIA: SBP <220 until imaging confirmation of no infarct

## 2024-11-14 NOTE — PLAN OF CARE
AVS reviewed w/ pt and purposeful rounding complete. IV and telemetry discontinued per discharge orders. Pt discharged home via spouse.

## 2024-11-14 NOTE — ASSESSMENT & PLAN NOTE
Patient's most recent potassium results are listed below.   Recent Labs     11/13/24  1401 11/14/24  0527   K 3.2* 3.4*       Plan  - Replete potassium per protocol  - Monitor potassium Daily  - replete KCl 20 mEQ x4  - encourage daily intake of high potassium containing foods

## 2024-11-14 NOTE — PLAN OF CARE
"[place] [facility]  Initial Discharge Assessment       Primary Care Provider: Karmen Bradford MD    Admission Diagnosis: Acute focal neurological deficit [R29.818]  Cerebrovascular accident (CVA), unspecified mechanism [I63.9]    Admission Date: 11/13/2024  Expected Discharge Date:     Transition of Care Barriers: None    Payor: MEDICAID / Plan: [facility] COMMUNITY PLAN [facility] ([place] MEDICAID) / Product Type: Managed Medicaid /     Extended Emergency Contact Information  Primary Emergency Contact: Andrew Diego   [place]  Mobile Phone: [phone]  Relation: Spouse  Secondary Emergency Contact: Maty Campos  Mobile Phone: [phone]  Relation: Daughter  Preferred language: English   needed? No    Discharge Plan A: Home  Discharge Plan B: Rehab      [facility] [address]  [address]  [facility] [address]  Phone: [phone] Fax: [phone]    [id number] [address] [facility] [address]  [address]  [facility] [address]  Phone: [phone] Fax: [phone]    [facility] [address]   [address] [facility] [address]  Phone: [phone] Fax: [phone]      Initial Assessment (most recent)       Adult Discharge Assessment - 11/14/24 0731          Discharge Assessment    Assessment Type Discharge Planning Assessment     Confirmed/corrected address, phone number and insurance Yes     Confirmed Demographics Correct on Facesheet     Source of Information patient     When was your last doctors appointment? --   "At the end of last month."    Reason For Admission Left side weakness and speech problems     People in Home alone;child(aubrey), dependent     Do you expect to return to your current living situation? Yes     Do you have help at home or someone to help you manage your care at home? Yes     Who are your caregiver(s) and their phone " number(s)? spouse Andrew     Prior to hospitilization cognitive status: Alert/Oriented     Current cognitive status: Alert/Oriented     Walking or Climbing Stairs Difficulty no     Dressing/Bathing Difficulty no     Home Accessibility stairs to enter home     Number of Stairs, Main Entrance five     Stair Railings, Main Entrance railing on left side (ascending)     Equipment Currently Used at Home none     Readmission within 30 days? No     Patient currently being followed by outpatient case management? No     Do you currently have service(s) that help you manage your care at home? No     Do you take prescription medications? Yes     Do you have prescription coverage? Yes     Coverage [facility] Medicaid     Do you have any problems affording any of your prescribed medications? No     Is the patient taking medications as prescribed? yes     Who is going to help you get home at discharge? spouse Andrew     Are you on dialysis? No     Do you take coumadin? No     Discharge Plan A Home     Discharge Plan B Rehab     DME Needed Upon Discharge  other (see comments)   TBD    Discharge Plan discussed with: Patient     Transition of Care Barriers None        Physical Activity    On average, how many days per week do you engage in moderate to strenuous exercise (like a brisk walk)? 0 days     On average, how many minutes do you engage in exercise at this level? 0 min        Financial Resource Strain    How hard is it for you to pay for the very basics like food, housing, medical care, and heating? Not hard at all        Housing Stability    In the last 12 months, was there a time when you were not able to pay the mortgage or rent on time? No     At any time in the past 12 months, were you homeless or living in a shelter (including now)? No        Transportation Needs    Has the lack of transportation kept you from medical appointments, meetings, work or from getting things needed for daily living? No        Food Insecurity    Within  the past 12 months, you worried that your food would run out before you got the money to buy more. Never true        Stress    Do you feel stress - tense, restless, nervous, or anxious, or unable to sleep at night because your mind is troubled all the time - these days? Very much        Social Isolation    How often do you feel lonely or isolated from those around you?  Never        Alcohol Use    Q1: How often do you have a drink containing alcohol? Never     Q2: How many drinks containing alcohol do you have on a typical day when you are drinking? Patient does not drink     Q3: How often do you have six or more drinks on one occasion? Never        Utilities    In the past 12 months has the electric, gas, oil, or water company threatened to shut off services in your home? No        Health Literacy    How often do you need to have someone help you when you read instructions, pamphlets, or other written material from your doctor or pharmacy? Never        OTHER    Name(s) of People in Home Andrew (Spouse) & Zohaib (son)                      DCP completed at bedside with patient. Patient is AAO x 4. Dysarthria noted when speaking. Patient states she has had 2 strokes in the past and had left sided weakness, but never needed therapy. Patient does not own any DME as she has never had to use any. If needed, patient states her  is able to assist her at home and she also has a 18 yo son that can assist. Patient states she is currently walking independently to bathroom and it is her speech that has given her issues. Informed her that likely PT/OT will evaluate and determine if they will make any recommendations. Patient verbalized understanding. CM/SW to remain available for any needs.

## 2024-11-14 NOTE — PLAN OF CARE
Plan of care reviewed with patient and spouse at the bedside. Peripheral IV in place and saline locked. Pt tolerated meds well. Administered PO potassium and Pedialyte per MD orders for  electrolyte replenishment and hydration. PRN tylenol given for complaints of headache w/ relief obtained. Telemetry connections intact running NSR- sinus wm on the monitor. Pt HR drops below 60 when pt is in a restful sleep. Pt denies any SOB, CP, or fatigue w/ associated HR. Otherwise VSS. NADN. Pt free from falls and injury. Questions and concerns addressed. Pt belongings and call bell within reach.   Problem: Adult Inpatient Plan of Care  Goal: Plan of Care Review  Outcome: Progressing  Goal: Patient-Specific Goal (Individualized)  Outcome: Progressing  Goal: Absence of Hospital-Acquired Illness or Injury  Outcome: Progressing  Goal: Optimal Comfort and Wellbeing  Outcome: Progressing  Goal: Readiness for Transition of Care  Outcome: Progressing     Problem: Stroke, Ischemic (Includes Transient Ischemic Attack)  Goal: Optimal Coping  Outcome: Progressing  Goal: Effective Bowel Elimination  Outcome: Progressing  Goal: Optimal Cerebral Tissue Perfusion  Outcome: Progressing  Goal: Optimal Cognitive Function  Outcome: Progressing  Goal: Improved Communication Skills  Outcome: Progressing  Goal: Optimal Functional Ability  Outcome: Progressing  Goal: Optimal Nutrition Intake  Outcome: Progressing  Goal: Effective Oxygenation and Ventilation  Outcome: Progressing  Goal: Improved Sensorimotor Function  Outcome: Progressing  Goal: Safe and Effective Swallow  Outcome: Progressing  Goal: Effective Urinary Elimination  Outcome: Progressing     Problem: Communication Impairment  Goal: Effective Communication Skills  Outcome: Progressing

## 2024-11-14 NOTE — ASSESSMENT & PLAN NOTE
See above management. Follow up MRI.   CT-scan of the brain no acute intracranial hemorrhage or ischemia noted

## 2024-11-14 NOTE — DISCHARGE SUMMARY
[facility] Medicine  Discharge Summary      Patient Name: Mallorie Diego  MRN: [id number]  CHRISTOPHER: [id number]  Patient Class: IP- Inpatient  Admission Date: 11/13/2024  Hospital Length of Stay: 1 days  Discharge Date and Time:  11/14/2024 12:59 PM  Attending Physician: Dom Lomax DO   Discharging Provider: Dom Lomax DO  Primary Care Provider: Karmen Bradford MD    Primary Care Team: Networked reference to record PCT     HPI:   49 year old with hypertension, hyperlipidemia, peripheral artery disease s/p stents in bilateral iliac arteries, distal aorta, history of right frontal stroke, seizures, pulmonary emboli, presents with hand weakness and worsening of slurred speech.  She reports symptoms started around 10 am today.  She reports to me that L hand feels weaker than baseline, but noted to ED physician that R hand was weaker.    ER course: vital signs on arrival  /51 mmHg, HR 72, RR 18, temp 98F, SpO2 97% on room air.   Head CT findings of no acute intracranial processes, remote right frontal stroke, CTA Head & Neck no large vessel occlusive disease, 50-70% stenosis proximal aspect of cervical segment left internal carotid artery.  Tele neurology consulted with recommendations to start DAPT and follow up MRI study.       * No surgery found *      Hospital Course:   11/14/24 Sitting upright in bed with  at bedside. Patient endorses feeling back to her baseline, speech too is reported to be at baseline.Denies fever, chills, vision changes, dizziness, chest pain, nausea, vomiting.   Communicating with minimal difficulty. Tolerating PO intake. Patient states intolerance to plavix and statin and will continue with daily aspirin. MRI findings of Chronic ischemic disease. No evidence of acute ischemia. Left mastoid disease.   Patient is medically stable for discharge to home with close follow up with primary care provider and specialists including cardiology and  neurology.         Goals of Care Treatment Preferences:  Code Status: Full Code         Consults:   Consults (From admission, onward)          Status Ordering Provider     Consult to Telemedicine - Acute Stroke  Once        Provider:  Phoebe Wilson MD    Acknowledged BRENDA BAKER            Neuro  * Cerebrovascular accident (CVA)  Patient presenting with worsening of baseline dysarthria +/- hadn weakness. Carotid artery disease, patient at higher risk for stroke, per neurology, started on DAPT aspirin and plavix.   Recommend MRI brain in addition to infectious/metabolic workup.   Medical chart review, dysarthria, speech deficit noted in 8/2021 with inconsistent follow up with speech therapy outpatient.   Antithrombotics for secondary stroke prevention: Antiplatelets: Aspirin: 81 mg daily  Patient endorses chest tightness with use of clopidogrel.   Discontinue daily Clopidogrel: 75 mg, continue with daily baby aspirin.     Statins for secondary stroke prevention and hyperlipidemia, if present:   Statins: Other cholesterol lowering medications:      Aggressive risk factor modification: HTN, Smoking, HLD, Diet, Obesity, CAD     Rehab efforts: The patient has been evaluated by a stroke team provider and the therapy needs have been fully considered based off the presenting complaints and exam findings. The following therapy evaluations are needed: SLP evaluate and treat    Diagnostics ordered/pending: MRI head without contrast to assess brain parenchyma, MRI findings of chronic ischemic disease    VTE prophylaxis: Mechanical prophylaxis: Place SCDs    BP parameters: TIA: SBP <220 until imaging confirmation of no infarct         Acute focal neurological deficit  See above management. Follow up MRI.   CT-scan of the brain no acute intracranial hemorrhage or ischemia noted.  11/14/24 MRI findings chronic ischemic disease, no acute intracranial findings.   Slurred speech improved back to patient's baseline.  Patient and  endorse close follow up with primary neurologist outpatient.       Slurred speech  See above management. Acute on chronic from past CVA history. Improved overnight. No further complaints of neurological deficits.     Psychiatric  Chronic, continuous use of opioids  Monitor for withdrawal signs. Currently taking oxycodone 10 mg BID PRN      Renal/  Hypokalemia  Patient's most recent potassium results are listed below.   Recent Labs     11/13/24  1401 11/14/24  0527   K 3.2* 3.4*       Plan  - Replete potassium per protocol  - Monitor potassium Daily  - replete KCl 20 mEQ x4  - encourage daily intake of high potassium containing foods      Hematology  Pulmonary embolism-resolved as of 11/14/2024  Past history. Patient completed 2 months of therapy and discontinued use.         Final Active Diagnoses:    Diagnosis Date Noted POA    PRINCIPAL PROBLEM:  Cerebrovascular accident (CVA) [I63.9] 06/16/2021 Yes    Slurred speech [R47.81] 11/13/2024 Yes    Acute focal neurological deficit [R29.818] 11/13/2024 Yes    Chronic, continuous use of opioids [F11.90] 11/13/2024 Yes    Hypokalemia [E87.6] 11/13/2024 Yes      Problems Resolved During this Admission:    Diagnosis Date Noted Date Resolved POA    Pulmonary embolism [I26.99]  11/14/2024 No       Discharged Condition: stable    Disposition: Home or Self Care    Follow Up:   Follow-up Information       Karmen Bradford MD Follow up on 11/21/2024.    Specialty: Internal Medicine  Why: at 1015am  Contact information:  [address] [facility] [address]  [facility] [address]  [phone]               Misael Desir MD Follow up on 11/21/2024.    Specialty: Cardiology  Why: at 9:50am  Contact information:  [address] [address]  [phone]               [facility]    Specialty: Neurology  Why: Patient will recieve a call regarding initial appointments.  Please accept this communication.  Contact information:  [address]  [place] [address]  [phone]                         Patient Instructions:      Ambulatory referral/consult to Neurology   Standing Status: Future   Referral Priority: Urgent Referral Type: Consultation   Referral Reason: Specialty Services Required   Requested Specialty: Neurology   Number of Visits Requested: 1       Significant Diagnostic Studies: Labs: BMP:   Recent Labs   Lab 11/13/24  1401 11/14/24  0527    110    136   K 3.2* 3.4*    101   CO2 29 28   BUN 17 16   CREATININE 1.2 1.1   CALCIUM 9.0 8.8   MG  --  1.7   , CMP   Recent Labs   Lab 11/13/24  1401 11/14/24  0527    136   K 3.2* 3.4*    101   CO2 29 28    110   BUN 17 16   CREATININE 1.2 1.1   CALCIUM 9.0 8.8   PROT 7.0  --    ALBUMIN 3.9  --    BILITOT 0.5  --    ALKPHOS 88  --    AST 23  --    ALT 37  --    ANIONGAP 8 7   , CBC   Recent Labs   Lab 11/13/24  1401 11/14/24  0527   WBC 7.68 6.95   HGB 15.5 15.1   HCT 44.9 44.3    219   , and All labs within the past 24 hours have been reviewed  Radiology: MRI: Findings of chronic ischemic disease, no acute intracranial findings.     Pending Diagnostic Studies:       None           Medications:  Reconciled Home Medications:      Medication List        START taking these medications      magnesium oxide 400 mg (241.3 mg magnesium) tablet  Commonly known as: MAG-OX  Take 1 tablet (400 mg total) by mouth 2 (two) times daily.            CHANGE how you take these medications      labetaloL 100 MG tablet  Commonly known as: NORMODYNE  Take 200 mg by mouth 2 (two) times daily.  What changed: Another medication with the same name was removed. Continue taking this medication, and follow the directions you see here.            CONTINUE taking these medications      albuterol 90 mcg/actuation inhaler  Commonly known as: PROVENTIL/VENTOLIN HFA  Inhale 2 puffs into the lungs every 6 (six)  hours as needed for Wheezing.     aspirin 81 MG EC tablet  Commonly known as: ECOTRIN  Take 1 tablet (81 mg total) by mouth once daily.     cloNIDine 0.1 MG tablet  Commonly known as: CATAPRES  Take 0.1 mg by mouth 2 (two) times daily.     losartan 25 MG tablet  Commonly known as: COZAAR  Take 25 mg by mouth once daily.     NIFEdipine 90 MG Tbsr  Commonly known as: ADALAT CC  TAKE ONE TABLET BY MOUTH ONCE A DAY THANK YOU            STOP taking these medications      diazePAM 5 MG tablet  Commonly known as: VALIUM              Indwelling Lines/Drains at time of discharge:   Lines/Drains/Airways       None                   Time spent on the discharge of patient: 35 minutes         Dom Lomax DO  Department of Hospital Medicine  [facility] Surg

## 2024-11-14 NOTE — HOSPITAL COURSE
11/14/24 Sitting upright in bed with  at bedside. Patient endorses feeling back to her baseline, speech too is reported to be at baseline.Denies fever, chills, vision changes, dizziness, chest pain, nausea, vomiting.   Communicating with minimal difficulty. Tolerating PO intake. Patient states intolerance to plavix and statin and will continue with daily aspirin. MRI findings of Chronic ischemic disease. No evidence of acute ischemia. Left mastoid disease.   Patient is medically stable for discharge to home with close follow up with primary care provider and specialists including cardiology and neurology.

## 2024-11-14 NOTE — ASSESSMENT & PLAN NOTE
Patient's most recent potassium results are listed below.   Recent Labs     11/13/24  1401   K 3.2*     Plan  - Replete potassium per protocol  - Monitor potassium Daily  - replete KCl 20 mEQ x4

## 2024-11-14 NOTE — HPI
49 year old with hypertension, hyperlipidemia, peripheral artery disease s/p stents in bilateral iliac arteries, distal aorta, history of right frontal stroke, seizures, pulmonary emboli, presents with hand weakness and worsening of slurred speech.  She reports symptoms started around 10 am today.  She reports to me that L hand feels weaker than baseline, but noted to ED physician that R hand was weaker.    ER course: vital signs on arrival  /51 mmHg, HR 72, RR 18, temp 98F, SpO2 97% on room air.   Head CT findings of no acute intracranial processes, remote right frontal stroke, CTA Head & Neck no large vessel occlusive disease, 50-70% stenosis proximal aspect of cervical segment left internal carotid artery.  Tele neurology consulted with recommendations to start DAPT and follow up MRI study.

## 2024-11-14 NOTE — ASSESSMENT & PLAN NOTE
Patient presenting with worsening of baseline dysarthria +/- hadn weakness. Carotid artery disease, patient at higher risk for stroke, per neurology, started on DAPT aspirin and plavix.   Recommend MRI brain in addition to infectious/metabolic workup.   Medical chart review, dysarthria, speech deficit noted in 8/2021 with inconsistent follow up with speech therapy outpatient.   Antithrombotics for secondary stroke prevention: Antiplatelets: Aspirin: 81 mg daily  Clopidogrel: 75 mg daily    Statins for secondary stroke prevention and hyperlipidemia, if present:   Statins: Other cholesterol lowering medications:      Aggressive risk factor modification: HTN, Smoking, HLD, Diet, Obesity, CAD     Rehab efforts: The patient has been evaluated by a stroke team provider and the therapy needs have been fully considered based off the presenting complaints and exam findings. The following therapy evaluations are needed: SLP evaluate and treat    Diagnostics ordered/pending: MRI head without contrast to assess brain parenchyma    VTE prophylaxis: Mechanical prophylaxis: Place SCDs    BP parameters: TIA: SBP <220 until imaging confirmation of no infarct

## 2024-12-23 ENCOUNTER — HOSPITAL ENCOUNTER (OUTPATIENT)
Dept: RADIOLOGY | Facility: HOSPITAL | Age: 49
Discharge: HOME | End: 2024-12-23
Attending: INTERNAL MEDICINE
Payer: MEDICAID

## 2024-12-23 DIAGNOSIS — I70.213 ATHEROSCLEROSIS OF NATIVE ARTERY OF BOTH LOWER EXTREMITIES WITH INTERMITTENT CLAUDICATION: ICD-10-CM

## 2024-12-23 DIAGNOSIS — I25.10 CAD (CORONARY ARTERY DISEASE), NATIVE CORONARY ARTERY: ICD-10-CM

## 2024-12-23 DIAGNOSIS — I20.9 ANGINA, CLASS III: ICD-10-CM

## 2024-12-23 DIAGNOSIS — I10 HTN (HYPERTENSION): ICD-10-CM

## 2024-12-23 DIAGNOSIS — I20.9 ANGINA, CLASS III: Primary | ICD-10-CM

## 2024-12-23 PROCEDURE — 71046 X-RAY EXAM CHEST 2 VIEWS: CPT | Mod: TC

## 2025-01-08 ENCOUNTER — HOSPITAL ENCOUNTER (OUTPATIENT)
Dept: RADIOLOGY | Facility: HOSPITAL | Age: 50
Discharge: HOME | End: 2025-01-08
Attending: INTERNAL MEDICINE
Payer: MEDICAID

## 2025-01-08 VITALS — BODY MASS INDEX: 38.58 KG/M2 | WEIGHT: 226 LBS | HEIGHT: 64 IN

## 2025-01-08 DIAGNOSIS — Z12.31 ENCOUNTER FOR SCREENING MAMMOGRAM FOR MALIGNANT NEOPLASM OF BREAST: ICD-10-CM

## 2025-01-08 PROCEDURE — 77063 BREAST TOMOSYNTHESIS BI: CPT | Mod: TC
